# Patient Record
Sex: MALE | Race: WHITE | NOT HISPANIC OR LATINO | Employment: PART TIME | URBAN - METROPOLITAN AREA
[De-identification: names, ages, dates, MRNs, and addresses within clinical notes are randomized per-mention and may not be internally consistent; named-entity substitution may affect disease eponyms.]

---

## 2018-06-19 ENCOUNTER — HOSPITAL ENCOUNTER (EMERGENCY)
Facility: HOSPITAL | Age: 18
Discharge: LEFT BEFORE DISCHARGE | End: 2018-06-19
Payer: COMMERCIAL

## 2018-06-19 VITALS
HEART RATE: 97 BPM | OXYGEN SATURATION: 98 % | DIASTOLIC BLOOD PRESSURE: 77 MMHG | RESPIRATION RATE: 18 BRPM | SYSTOLIC BLOOD PRESSURE: 154 MMHG | TEMPERATURE: 99 F

## 2018-06-19 PROCEDURE — 93005 ELECTROCARDIOGRAM TRACING: CPT

## 2018-06-20 LAB
ATRIAL RATE: 99 BPM
P AXIS: 33 DEGREES
PR INTERVAL: 152 MS
QRS AXIS: 12 DEGREES
QRSD INTERVAL: 72 MS
QT INTERVAL: 322 MS
QTC INTERVAL: 405 MS
T WAVE AXIS: 51 DEGREES
VENTRICULAR RATE: 95 BPM

## 2018-06-20 PROCEDURE — 93010 ELECTROCARDIOGRAM REPORT: CPT | Performed by: INTERNAL MEDICINE

## 2019-08-13 ENCOUNTER — OFFICE VISIT (OUTPATIENT)
Dept: URGENT CARE | Age: 19
End: 2019-08-13
Payer: COMMERCIAL

## 2019-08-13 VITALS
TEMPERATURE: 98.5 F | DIASTOLIC BLOOD PRESSURE: 61 MMHG | HEART RATE: 95 BPM | SYSTOLIC BLOOD PRESSURE: 125 MMHG | BODY MASS INDEX: 39.17 KG/M2 | OXYGEN SATURATION: 96 % | HEIGHT: 75 IN | RESPIRATION RATE: 20 BRPM | WEIGHT: 315 LBS

## 2019-08-13 DIAGNOSIS — L02.416 ABSCESS OF LEFT THIGH: Primary | ICD-10-CM

## 2019-08-13 PROCEDURE — 99203 OFFICE O/P NEW LOW 30 MIN: CPT | Performed by: NURSE PRACTITIONER

## 2019-08-13 PROCEDURE — G0382 LEV 3 HOSP TYPE B ED VISIT: HCPCS | Performed by: NURSE PRACTITIONER

## 2019-08-13 PROCEDURE — 99283 EMERGENCY DEPT VISIT LOW MDM: CPT | Performed by: NURSE PRACTITIONER

## 2019-08-13 RX ORDER — DULOXETIN HYDROCHLORIDE 60 MG/1
60 CAPSULE, DELAYED RELEASE ORAL DAILY
COMMUNITY
End: 2022-06-15

## 2019-08-13 RX ORDER — BUSPIRONE HYDROCHLORIDE 15 MG/1
15 TABLET ORAL 3 TIMES DAILY
COMMUNITY
End: 2022-06-15

## 2019-08-13 RX ORDER — LITHIUM CARBONATE 300 MG
300 TABLET ORAL 3 TIMES DAILY
COMMUNITY
End: 2022-06-15

## 2019-08-13 RX ORDER — CEPHALEXIN 500 MG/1
500 CAPSULE ORAL EVERY 8 HOURS SCHEDULED
Qty: 21 CAPSULE | Refills: 0 | Status: SHIPPED | OUTPATIENT
Start: 2019-08-13 | End: 2019-08-20

## 2019-08-13 RX ORDER — SULFAMETHOXAZOLE AND TRIMETHOPRIM 800; 160 MG/1; MG/1
1 TABLET ORAL EVERY 12 HOURS SCHEDULED
Qty: 20 TABLET | Refills: 0 | Status: SHIPPED | OUTPATIENT
Start: 2019-08-13 | End: 2019-08-23

## 2019-08-13 NOTE — PROGRESS NOTES
NAME: Nadir Jiménez is a 19 y o  male  : 2000    MRN: [id number]      Assessment and Plan   Abscess of left thigh [L02 416]  1  Abscess of left thigh  sulfamethoxazole-trimethoprim (BACTRIM DS) 800-160 mg per tablet    cephalexin (KEFLEX) 500 mg capsule       Radha Benítez was seen today for mass  Diagnoses and all orders for this visit:    Abscess of left thigh  -     sulfamethoxazole-trimethoprim (BACTRIM DS) 800-160 mg per tablet; Take 1 tablet by mouth every 12 (twelve) hours for 10 days  -     cephalexin (KEFLEX) 500 mg capsule; Take 1 capsule (500 mg total) by mouth every 8 (eight) hours for 7 days     Abscesses at this time are unable to be cultured    Patient Instructions   Patient Instructions   Discussed with patient about Cindy Holley the abscess and having a drain and packing it however patient will not be in the area and does not want to go the ER  He does not want to have it opened and is aware of the risks of not having it drained  If symptoms worsen he will go to the ER  Aware to take antibiotics as directed      We offer over-the-counter meds that can be purchased here at the office for your convenience   Cough and cold meds:   Mucinex for $14 00   Mucinex DM for $14 00   Delsym for $14 dollars   Cepacol Extra strength for $4 00,     Allergy medicine  Bendaryl for $4 00  Cetrizine (Zyrtec) for $4 00,     Pain relief  Ibuprofen (motrin) for $4 00  Acetaminophen (tylenol) for $4 00  Childrens tylenol and motrin for $4 00    Itch and Rash Relief  % Hydrocortisone Cream for $4 00      Follow up with pcp   Discussed with patient that these abscesses continue to worsen he will need to go to the ER for possible packing  Patient is leaving in 2 days for lotion trip and aware to take both antibiotics  Patient also discussed phone up with General surgery as well the multiple abscesses had over the years and scarring    Symptoms of fever, increased pain swelling continue go to the ER      Abscess   WHAT YOU NEED TO KNOW:   A warm compress may help your abscess drain  Your healthcare provider may make a cut in the abscess so it can drain  You may need surgery to remove an abscess that is on your hands or buttocks  DISCHARGE INSTRUCTIONS:   Return to the emergency department if:   · The area around your abscess becomes very painful, warm, or has red streaks  · You have a fever and chills  · Your heart is beating faster than usual      · You feel faint or confused  Contact your healthcare provider if:   · Your abscess gets bigger or does not get better  · Your abscess returns  · You have questions or concerns about your condition or care  Medicines: You may  need any of the following:  · Antibiotics  help treat a bacterial infection  · Acetaminophen  decreases pain and fever  It is available without a doctor's order  Ask how much to take and how often to take it  Follow directions  Acetaminophen can cause liver damage if not taken correctly  · NSAIDs , such as ibuprofen, help decrease swelling, pain, and fever  This medicine is available with or without a doctor's order  NSAIDs can cause stomach bleeding or kidney problems in certain people  If you take blood thinner medicine, always ask your healthcare provider if NSAIDs are safe for you  Always read the medicine label and follow directions  · Take your medicine as directed  Contact your healthcare provider if you think your medicine is not helping or if you have side effects  Tell him or her if you are allergic to any medicine  Keep a list of the medicines, vitamins, and herbs you take  Include the amounts, and when and why you take them  Bring the list or the pill bottles to follow-up visits  Carry your medicine list with you in case of an emergency  Self-care:   · Apply a warm compress to your abscess  This will help it open and drain  Wet a washcloth in warm, but not hot, water  Apply the compress for 10 minutes   Repeat this 4 times each day  Do not  press on an abscess or try to open it with a needle  You may push the bacteria deeper or into your blood  · Do not share your clothes, towels, or sheets with anyone  This can spread the infection to others  · Wash your hands often  This can help prevent the spread of germs  Use soap and water or an alcohol-based hand rub  Care for your wound after it is drained:   · Care for your wound as directed  If your healthcare provider says it is okay, carefully remove the bandage and gauze packing  You may need to soak the gauze to get it out of your wound  Clean your wound and the area around it as directed  Dry the area and put on new, clean bandages  Change your bandages when they get wet or dirty  · Ask your healthcare provider how to change the gauze in your wound  Keep track of how many pieces of gauze are placed inside the wound  Do not put too much packing in the wound  Do not pack the gauze too tightly in your wound  Follow up with your healthcare provider in 1 to 3 days: You may need to have your packing removed or your bandage changed  Write down your questions so you remember to ask them during your visits  © 2017 [facility] [facility] Information is for End User's use only and may not be sold, redistributed or otherwise used for commercial purposes  All illustrations and images included in CareNotes® are the copyrighted property of A D A M , Inc  or Connor Alcaraz  The above information is an  only  It is not intended as medical advice for individual conditions or treatments  Talk to your doctor, nurse or pharmacist before following any medical regimen to see if it is safe and effective for you  Proceed to ER if symptoms worsen  Chief Complaint     Chief Complaint   Patient presents with    Mass     Pt reports he has two lumps on his inner and outer thigh x two weeks that are red, inflamed and draining  Has been applying Neosporin  History of Present Illness     Patient is a 68-year-old male who is here today with abscess on the left thigh inner and laterally  He has of multiple abscesses in the past and has never been followed up further  He has scar tissue to the left side of the inner and is very bothersome to touch  The abscess had sinus left inner thigh is large in size and painful to touch  He has a lot scarring and dimpling noted on the abscesses well  He has a small abscess noted to the lateral aspect of the left thigh tender to touch and has open wound to the past   He has not followed up with family doctor for this and is aware of the importance of following up with PCP  He has come in tonight due to going to vacation in 2 days to the [place] [facility]   Skin: Positive for wound (Abscesses noted to the left inner thigh and lateral aspect)  Current Medications       Current Outpatient Medications:     busPIRone (BUSPAR) 15 mg tablet, Take 15 mg by mouth 3 (three) times a day, Disp: , Rfl:     DULoxetine (CYMBALTA) 60 mg delayed release capsule, Take 60 mg by mouth daily, Disp: , Rfl:     lithium 300 MG tablet, Take 300 mg by mouth 3 (three) times a day, Disp: , Rfl:     metFORMIN (GLUCOPHAGE) 500 mg tablet, Take 500 mg by mouth 2 (two) times a day with meals, Disp: , Rfl:     cephalexin (KEFLEX) 500 mg capsule, Take 1 capsule (500 mg total) by mouth every 8 (eight) hours for 7 days, Disp: 21 capsule, Rfl: 0    sulfamethoxazole-trimethoprim (BACTRIM DS) 800-160 mg per tablet, Take 1 tablet by mouth every 12 (twelve) hours for 10 days, Disp: 20 tablet, Rfl: 0    Current Allergies     Allergies as of 08/13/2019 - Reviewed 08/13/2019   Allergen Reaction Noted    Wellbutrin [bupropion]  06/19/2018              Past Medical History:   Diagnosis Date    Anxiety     Depressed     Sleep apnea        History reviewed  No pertinent surgical history  History reviewed  No pertinent family history  Medications have been verified  The following portions of the patient's history were reviewed and updated as appropriate: allergies, current medications, past family history, past medical history, past social history, past surgical history and problem list     Objective   /61   Pulse 95   Temp 98 5 °F (36 9 °C)   Resp 20   Ht 6' 3" (1 905 m)   Wt (!) 144 kg (317 lb)   SpO2 96%   BMI 39 62 kg/m²      Physical Exam     Physical Exam   Constitutional: He appears well-developed and well-nourished  Skin: Skin is warm  Capillary refill takes less than 2 seconds  No rash noted  Psychiatric: He has a normal mood and affect         VICENTE Pacheco

## 2019-08-13 NOTE — PATIENT INSTRUCTIONS
Discussed with patient about Yanet Naga the abscess and having a drain and packing it however patient will not be in the area and does not want to go the ER  He does not want to have it opened and is aware of the risks of not having it drained  If symptoms worsen he will go to the ER  Aware to take antibiotics as directed      We offer over-the-counter meds that can be purchased here at the office for your convenience   Cough and cold meds:   Mucinex for $14 00   Mucinex DM for $14 00   Delsym for $14 dollars   Cepacol Extra strength for $4 00,     Allergy medicine  Bendaryl for $4 00  Cetrizine (Zyrtec) for $4 00,     Pain relief  Ibuprofen (motrin) for $4 00  Acetaminophen (tylenol) for $4 00  Childrens tylenol and motrin for $4 00    Itch and Rash Relief  % Hydrocortisone Cream for $4 00      Follow up with pcp   Discussed with patient that these abscesses continue to worsen he will need to go to the ER for possible packing  Patient is leaving in 2 days for lotion trip and aware to take both antibiotics  Patient also discussed phone up with General surgery as well the multiple abscesses had over the years and scarring  Symptoms of fever, increased pain swelling continue go to the ER      Abscess   WHAT YOU NEED TO KNOW:   A warm compress may help your abscess drain  Your healthcare provider may make a cut in the abscess so it can drain  You may need surgery to remove an abscess that is on your hands or buttocks  DISCHARGE INSTRUCTIONS:   Return to the emergency department if:   · The area around your abscess becomes very painful, warm, or has red streaks  · You have a fever and chills  · Your heart is beating faster than usual      · You feel faint or confused  Contact your healthcare provider if:   · Your abscess gets bigger or does not get better  · Your abscess returns  · You have questions or concerns about your condition or care  Medicines:   You may  need any of the following:  · Antibiotics  help treat a bacterial infection  · Acetaminophen  decreases pain and fever  It is available without a doctor's order  Ask how much to take and how often to take it  Follow directions  Acetaminophen can cause liver damage if not taken correctly  · NSAIDs , such as ibuprofen, help decrease swelling, pain, and fever  This medicine is available with or without a doctor's order  NSAIDs can cause stomach bleeding or kidney problems in certain people  If you take blood thinner medicine, always ask your healthcare provider if NSAIDs are safe for you  Always read the medicine label and follow directions  · Take your medicine as directed  Contact your healthcare provider if you think your medicine is not helping or if you have side effects  Tell him or her if you are allergic to any medicine  Keep a list of the medicines, vitamins, and herbs you take  Include the amounts, and when and why you take them  Bring the list or the pill bottles to follow-up visits  Carry your medicine list with you in case of an emergency  Self-care:   · Apply a warm compress to your abscess  This will help it open and drain  Wet a washcloth in warm, but not hot, water  Apply the compress for 10 minutes  Repeat this 4 times each day  Do not  press on an abscess or try to open it with a needle  You may push the bacteria deeper or into your blood  · Do not share your clothes, towels, or sheets with anyone  This can spread the infection to others  · Wash your hands often  This can help prevent the spread of germs  Use soap and water or an alcohol-based hand rub  Care for your wound after it is drained:   · Care for your wound as directed  If your healthcare provider says it is okay, carefully remove the bandage and gauze packing  You may need to soak the gauze to get it out of your wound  Clean your wound and the area around it as directed  Dry the area and put on new, clean bandages   Change your bandages when they get wet or dirty  · Ask your healthcare provider how to change the gauze in your wound  Keep track of how many pieces of gauze are placed inside the wound  Do not put too much packing in the wound  Do not pack the gauze too tightly in your wound  Follow up with your healthcare provider in 1 to 3 days: You may need to have your packing removed or your bandage changed  Write down your questions so you remember to ask them during your visits  © 2017 [address] [employer] Information is for End User's use only and may not be sold, redistributed or otherwise used for commercial purposes  All illustrations and images included in CareNotes® are the copyrighted property of A D A M , Inc  or Connor Alcaraz  The above information is an  only  It is not intended as medical advice for individual conditions or treatments  Talk to your doctor, nurse or pharmacist before following any medical regimen to see if it is safe and effective for you

## 2019-08-20 ENCOUNTER — OFFICE VISIT (OUTPATIENT)
Dept: URGENT CARE | Age: 19
End: 2019-08-20
Payer: COMMERCIAL

## 2019-08-20 VITALS
WEIGHT: 315 LBS | OXYGEN SATURATION: 96 % | SYSTOLIC BLOOD PRESSURE: 128 MMHG | BODY MASS INDEX: 39.17 KG/M2 | HEART RATE: 90 BPM | TEMPERATURE: 98.2 F | RESPIRATION RATE: 20 BRPM | DIASTOLIC BLOOD PRESSURE: 59 MMHG | HEIGHT: 75 IN

## 2019-08-20 DIAGNOSIS — L02.416 ABSCESS OF LEFT THIGH: Primary | ICD-10-CM

## 2019-08-20 PROCEDURE — 99283 EMERGENCY DEPT VISIT LOW MDM: CPT | Performed by: FAMILY MEDICINE

## 2019-08-20 PROCEDURE — G0382 LEV 3 HOSP TYPE B ED VISIT: HCPCS | Performed by: FAMILY MEDICINE

## 2019-08-20 PROCEDURE — 87070 CULTURE OTHR SPECIMN AEROBIC: CPT | Performed by: FAMILY MEDICINE

## 2019-08-20 PROCEDURE — 99213 OFFICE O/P EST LOW 20 MIN: CPT | Performed by: FAMILY MEDICINE

## 2019-08-20 PROCEDURE — 87205 SMEAR GRAM STAIN: CPT | Performed by: FAMILY MEDICINE

## 2019-08-20 NOTE — PROGRESS NOTES
[facility] [facility]        NAME: Marcheta Fleischer is a 19 y o  male  : 2000    MRN: [id number]  DATE: 2019  TIME: 5:27 PM    Assessment and Plan   Abscess of left thigh [L02 416]  1  Abscess of left thigh           Patient Instructions     Continue current antibiotics  We will wait for culture results to determine if we need to add or change any antibiotics  May not have any preliminary results before Friday evening/Saturday  Follow up with PCP in 3-5 days  Proceed to  ER if symptoms worsen  Chief Complaint     Chief Complaint   Patient presents with    Rash     Pt seen here on  for bilateral abscesses on his upper thigh  Rx'd two Antibiotics  Pt states the areas have since opened and are draining  Reports mild pain  History of Present Illness       Rash (Pt seen here on  for bilateral abscesses on his upper thigh  Rx'd two Antibiotics  Pt states the areas have since opened and are draining  Reports mild pain  )    Rash         Review of Systems   Review of Systems   Constitutional: Negative  Respiratory: Negative  Cardiovascular: Negative  Skin: Positive for wound           Current Medications       Current Outpatient Medications:     busPIRone (BUSPAR) 15 mg tablet, Take 15 mg by mouth 3 (three) times a day, Disp: , Rfl:     cephalexin (KEFLEX) 500 mg capsule, Take 1 capsule (500 mg total) by mouth every 8 (eight) hours for 7 days, Disp: 21 capsule, Rfl: 0    DULoxetine (CYMBALTA) 60 mg delayed release capsule, Take 60 mg by mouth daily, Disp: , Rfl:     lithium 300 MG tablet, Take 300 mg by mouth 3 (three) times a day, Disp: , Rfl:     metFORMIN (GLUCOPHAGE) 500 mg tablet, Take 500 mg by mouth 2 (two) times a day with meals, Disp: , Rfl:     sulfamethoxazole-trimethoprim (BACTRIM DS) 800-160 mg per tablet, Take 1 tablet by mouth every 12 (twelve) hours for 10 days, Disp: 20 tablet, Rfl: 0    Current Allergies     Allergies as of 2019 - Reviewed 08/20/2019   Allergen Reaction Noted    Wellbutrin [bupropion]  06/19/2018            The following portions of the patient's history were reviewed and updated as appropriate: allergies, current medications, past family history, past medical history, past social history, past surgical history and problem list      Past Medical History:   Diagnosis Date    Anxiety     Depressed     Sleep apnea        History reviewed  No pertinent surgical history  History reviewed  No pertinent family history  Medications have been verified  Objective   /59   Pulse 90   Temp 98 2 °F (36 8 °C)   Resp 20   Ht 6' 3" (1 905 m)   Wt (!) 144 kg (318 lb)   SpO2 96%   BMI 39 75 kg/m²        Physical Exam     Physical Exam   Constitutional: He appears well-developed and well-nourished  Cardiovascular: Normal rate and regular rhythm     Pulmonary/Chest: Effort normal and breath sounds normal    Skin:

## 2019-08-20 NOTE — PATIENT INSTRUCTIONS
Continue current antibiotics  We will wait for culture results to determine if we need to add or change any antibiotics  May not have any preliminary results before Friday evening/Saturday  Follow up with PCP in 3-5 days  Proceed to  ER if symptoms worsen  Abscess   AMBULATORY CARE:   An abscess  is an area under the skin where pus (infected fluid) collects  An abscess is often caused by bacteria, fungi or other germs that get into an open wound  You can get an abscess anywhere on your body  Common signs and symptoms of an abscess: You may have a swollen mass that is red and painful  Pus may leak out of the mass  The pus will be white or yellow and may smell bad  You may have redness and pain days before the mass appears  You may have a fever and chills if the infection spreads  Seek immediate care if:   · The area around your abscess becomes very painful, warm, or has red streaks  · You have a fever and chills  · Your heart is beating faster than usual      · You feel faint or confused  Contact your healthcare provider if:   · Your abscess gets bigger or does not get better  · Your abscess returns  · You have questions or concerns about your condition or care  Treatment for an abscess: Your healthcare provider may need to make a cut in the abscess to allow the pus to drain  You may need surgery to remove your abscess  You may  need any of the following:  · Antibiotics  help treat a bacterial infection  · Acetaminophen  decreases pain and fever  It is available without a doctor's order  Ask how much to take and how often to take it  Follow directions  Acetaminophen can cause liver damage if not taken correctly  · NSAIDs , such as ibuprofen, help decrease swelling, pain, and fever  This medicine is available with or without a doctor's order  NSAIDs can cause stomach bleeding or kidney problems in certain people   If you take blood thinner medicine, always ask your healthcare provider if NSAIDs are safe for you  Always read the medicine label and follow directions  · Take your medicine as directed  Contact your healthcare provider if you think your medicine is not helping or if you have side effects  Tell him or her if you are allergic to any medicine  Keep a list of the medicines, vitamins, and herbs you take  Include the amounts, and when and why you take them  Bring the list or the pill bottles to follow-up visits  Carry your medicine list with you in case of an emergency  Self-care:   · Apply a warm compress to your abscess  This will help it open and drain  Wet a washcloth in warm, but not hot, water  Apply the compress for 10 minutes  Repeat this 4 times each day  Do not  press on an abscess or try to open it with a needle  You may push the bacteria deeper or into your blood  · Do not share your clothes, towels, or sheets with anyone  This can spread the infection to others  · Wash your hands often  This can help prevent the spread of germs  Use soap and water or an alcohol-based hand rub  Care for your wound after it is drained:   · Care for your wound as directed  If your healthcare provider says it is okay, carefully remove the bandage and gauze packing  You may need to soak the gauze to get it out of your wound  Clean your wound and the area around it as directed  Dry the area and put on new, clean bandages  Change your bandages when they get wet or dirty  · Ask your healthcare provider how to change the gauze in your wound  Keep track of how many pieces of gauze are placed inside the wound  Do not put too much packing in the wound  Do not pack the gauze too tightly in your wound  Follow up with your healthcare provider in 1 to 3 days: You may need to have your packing removed or your bandage changed  Write down your questions so you remember to ask them during your visits     © 2017 [address] [employer] Information is for End User's use only and may not be sold, redistributed or otherwise used for commercial purposes  All illustrations and images included in CareNotes® are the copyrighted property of A D A M , Inc  or Connor Alcaraz  The above information is an  only  It is not intended as medical advice for individual conditions or treatments  Talk to your doctor, nurse or pharmacist before following any medical regimen to see if it is safe and effective for you

## 2019-08-22 LAB
BACTERIA WND AEROBE CULT: NORMAL
GRAM STN SPEC: NORMAL

## 2019-08-23 ENCOUNTER — TELEPHONE (OUTPATIENT)
Dept: URGENT CARE | Facility: CLINIC | Age: 19
End: 2019-08-23

## 2019-08-23 LAB
BACTERIA WND AEROBE CULT: NORMAL
GRAM STN SPEC: NORMAL

## 2019-08-23 NOTE — TELEPHONE ENCOUNTER
Message left on Mobile number  Wound culture shows no bacterial growth  No need for further antibiotics at this point  But if the wounds aren't healing properly, you may need to follow up with your PCP to refer to a wound care center

## 2021-04-25 ENCOUNTER — TELEPHONE (OUTPATIENT)
Dept: OTHER | Facility: OTHER | Age: 21
End: 2021-04-25

## 2022-06-01 ENCOUNTER — HOSPITAL ENCOUNTER (INPATIENT)
Facility: HOSPITAL | Age: 22
LOS: 12 days | Discharge: HOME | DRG: 885 | End: 2022-06-15
Attending: EMERGENCY MEDICINE | Admitting: PSYCHIATRY & NEUROLOGY
Payer: COMMERCIAL

## 2022-06-01 DIAGNOSIS — F32.A DEPRESSION: ICD-10-CM

## 2022-06-01 DIAGNOSIS — Z00.8 MEDICAL CLEARANCE FOR PSYCHIATRIC ADMISSION: ICD-10-CM

## 2022-06-01 DIAGNOSIS — F33.2 SEVERE EPISODE OF RECURRENT MAJOR DEPRESSIVE DISORDER, WITHOUT PSYCHOTIC FEATURES (HCC): Primary | ICD-10-CM

## 2022-06-01 LAB
AMPHETAMINES SERPL QL SCN: NEGATIVE
BARBITURATES UR QL: NEGATIVE
BENZODIAZ UR QL: NEGATIVE
COCAINE UR QL: NEGATIVE
ETHANOL EXG-MCNC: 0 MG/DL
FLUAV RNA RESP QL NAA+PROBE: NEGATIVE
FLUBV RNA RESP QL NAA+PROBE: NEGATIVE
LITHIUM SERPL-SCNC: <0.2 MMOL/L (ref 0.6–1.2)
METHADONE UR QL: NEGATIVE
OPIATES UR QL SCN: NEGATIVE
OXYCODONE+OXYMORPHONE UR QL SCN: NEGATIVE
PCP UR QL: NEGATIVE
RSV RNA RESP QL NAA+PROBE: NEGATIVE
SARS-COV-2 RNA RESP QL NAA+PROBE: NEGATIVE
THC UR QL: NEGATIVE

## 2022-06-01 PROCEDURE — 80178 ASSAY OF LITHIUM: CPT | Performed by: EMERGENCY MEDICINE

## 2022-06-01 PROCEDURE — 99284 EMERGENCY DEPT VISIT MOD MDM: CPT | Performed by: EMERGENCY MEDICINE

## 2022-06-01 PROCEDURE — 80307 DRUG TEST PRSMV CHEM ANLYZR: CPT | Performed by: EMERGENCY MEDICINE

## 2022-06-01 PROCEDURE — 82075 ASSAY OF BREATH ETHANOL: CPT | Performed by: EMERGENCY MEDICINE

## 2022-06-01 PROCEDURE — 36415 COLL VENOUS BLD VENIPUNCTURE: CPT | Performed by: EMERGENCY MEDICINE

## 2022-06-01 PROCEDURE — 0241U HB NFCT DS VIR RESP RNA 4 TRGT: CPT | Performed by: EMERGENCY MEDICINE

## 2022-06-01 PROCEDURE — 99285 EMERGENCY DEPT VISIT HI MDM: CPT

## 2022-06-01 RX ORDER — LITHIUM CARBONATE 150 MG/1
450 CAPSULE ORAL
Status: DISCONTINUED | OUTPATIENT
Start: 2022-06-02 | End: 2022-06-02

## 2022-06-01 NOTE — ED NOTES
Given safety supper tray       Lillie Marshall RN  06/01/22 1825 Rx listed below.  Preferred pharmacy has been set up and verified.
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
## 2022-06-02 RX ORDER — LITHIUM CARBONATE 150 MG/1
CAPSULE ORAL
Status: COMPLETED
Start: 2022-06-02 | End: 2022-06-02

## 2022-06-02 RX ADMIN — LITHIUM CARBONATE 450 MG: 300 CAPSULE, GELATIN COATED ORAL at 12:44

## 2022-06-02 RX ADMIN — LITHIUM CARBONATE 450 MG: 300 CAPSULE, GELATIN COATED ORAL at 16:45

## 2022-06-02 RX ADMIN — LITHIUM CARBONATE 450 MG: 300 CAPSULE, GELATIN COATED ORAL at 08:30

## 2022-06-02 RX ADMIN — LITHIUM CARBONATE 450 MG: 150 CAPSULE, GELATIN COATED ORAL at 00:15

## 2022-06-02 RX ADMIN — LITHIUM CARBONATE 450 MG: 300 CAPSULE, GELATIN COATED ORAL at 00:15

## 2022-06-02 NOTE — ED NOTES
Patient is sleeping   No signs of distress noted at this time  Patient remains Q7 safety checks       Matthieu Bone RN  06/02/22 7324

## 2022-06-02 NOTE — ED NOTES
EVS (Eligibility Verification System) called - [phone]    Automated system indicates: not eligible

## 2022-06-02 NOTE — ED NOTES
Pt mostly asleep all day, arousable to verbal stimuli, not very interactive with staff         Martha Alejo RN  06/02/22 8592

## 2022-06-02 NOTE — ED NOTES
Pt in room sleeping at this time  Respirations are normal  No distress noted  All safety precautions still in place  Sitter is at bedside          Destiny Fernandez RN  06/02/22 9158

## 2022-06-02 NOTE — ED NOTES
Patient sleeping  Mother called to ask if she can see him  Will speak with crisis  Q 7 safety checks remain in place       Sole Hairston RN  06/02/22 4883

## 2022-06-02 NOTE — ED NOTES
Pt mother can be contacted at [phone] if additional information is needed or to update on Pt disposition

## 2022-06-02 NOTE — ED NOTES
Patient fed  With patient safety tray  Patient calm and cooperative       Day Pleitez RN  06/02/22 9649

## 2022-06-02 NOTE — ED NOTES
Patient resting comfortably in room  Patient is sleeping  Q 7 safety checks remain in place       Day Pleitez RN  06/02/22 1807

## 2022-06-02 NOTE — ED NOTES
Call from patient's mother, Opal Rubin, [phone], to check status of the patient  She stated he did respond to his previous [address] [facility] treatment and this was followed by 2 months of remission  Patient then started to experience increased depression and a second course of [address] [facility] was started  After about 20 treatments, the patient dropped out and became discouraged he would ever feel better  Cymbalta was tapered down and discontinued in preparation for medication changes  However, he was to continue lithium and has not been compliant  She stated the patient is currently on medical leave from work  She stated he has been drinking 15 beers a night and he is up all night and sleeping during the day

## 2022-06-02 NOTE — ED NOTES
Patient resting comfortably  Patient sleeping Q7 safety checks remain in place       Sharon Lopez RN  06/02/22 2854

## 2022-06-02 NOTE — ED NOTES
ED Crisis made the patient aware that a bed is not yet available  He laughed and stated, "I'm just laughing cause this is Kelsey " Clarified that he had to wait for a bed several years ago as well
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
## 2022-06-02 NOTE — ED NOTES
Patient resting comfortably  No signs of distress noted  Q 7 safety checks remain in place       Vladislav Nunn RN  06/02/22 0045

## 2022-06-02 NOTE — ED NOTES
Patient is sleeping at this time, RN assessment will be delayed until patient is awake  [facility], as unable to assess while patient sleeping  Behavioral Health Assessment deferred as patient is sleeping and would benefit from additional rest   Vital signs deferred until patient awake, no signs or symptoms of respiratory distress at this time  Once patient is awake and able to participate, will complete assessments        Edmond Del Rio RN  06/01/22 2003

## 2022-06-02 NOTE — ED NOTES
[place] Suicide Risk Assessment deferred, as unable to assess while patient sleeping  Behavioral Health Assessment deferred as patient is sleeping and would benefit from additional rest   Vital signs deferred until patient awake, no signs or symptoms of respiratory distress at this time  Once patient is awake and able to participate, will complete assessments        Ofelia Mcdonald RN  06/02/22 6855
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
## 2022-06-03 PROBLEM — F33.9 MAJOR DEPRESSION, RECURRENT (HCC): Status: ACTIVE | Noted: 2022-06-03

## 2022-06-03 LAB
ATRIAL RATE: 79 BPM
P AXIS: 22 DEGREES
PR INTERVAL: 152 MS
QRS AXIS: 36 DEGREES
QRSD INTERVAL: 82 MS
QT INTERVAL: 382 MS
QTC INTERVAL: 438 MS
T WAVE AXIS: 19 DEGREES
VENTRICULAR RATE: 79 BPM

## 2022-06-03 PROCEDURE — 93010 ELECTROCARDIOGRAM REPORT: CPT | Performed by: INTERNAL MEDICINE

## 2022-06-03 PROCEDURE — 93005 ELECTROCARDIOGRAM TRACING: CPT

## 2022-06-03 RX ORDER — ACETAMINOPHEN 325 MG/1
650 TABLET ORAL EVERY 6 HOURS PRN
Status: DISCONTINUED | OUTPATIENT
Start: 2022-06-03 | End: 2022-06-15 | Stop reason: HOSPADM

## 2022-06-03 RX ORDER — HYDROXYZINE HYDROCHLORIDE 25 MG/1
25 TABLET, FILM COATED ORAL
Status: DISCONTINUED | OUTPATIENT
Start: 2022-06-03 | End: 2022-06-03

## 2022-06-03 RX ORDER — HYDROXYZINE 50 MG/1
50 TABLET, FILM COATED ORAL
Status: DISCONTINUED | OUTPATIENT
Start: 2022-06-03 | End: 2022-06-15 | Stop reason: HOSPADM

## 2022-06-03 RX ORDER — HALOPERIDOL 5 MG
5 TABLET ORAL
Status: DISCONTINUED | OUTPATIENT
Start: 2022-06-03 | End: 2022-06-15 | Stop reason: HOSPADM

## 2022-06-03 RX ORDER — ARIPIPRAZOLE 2 MG/1
2 TABLET ORAL DAILY
Status: DISCONTINUED | OUTPATIENT
Start: 2022-06-03 | End: 2022-06-15 | Stop reason: HOSPADM

## 2022-06-03 RX ORDER — OLANZAPINE 10 MG/1
10 INJECTION, POWDER, LYOPHILIZED, FOR SOLUTION INTRAMUSCULAR
Status: DISCONTINUED | OUTPATIENT
Start: 2022-06-03 | End: 2022-06-03

## 2022-06-03 RX ORDER — LANOLIN ALCOHOL/MO/W.PET/CERES
3 CREAM (GRAM) TOPICAL
Status: DISCONTINUED | OUTPATIENT
Start: 2022-06-03 | End: 2022-06-15 | Stop reason: HOSPADM

## 2022-06-03 RX ORDER — LAMOTRIGINE 25 MG/1
25 TABLET ORAL DAILY
Status: DISCONTINUED | OUTPATIENT
Start: 2022-06-03 | End: 2022-06-06

## 2022-06-03 RX ORDER — BENZTROPINE MESYLATE 1 MG/ML
1 INJECTION INTRAMUSCULAR; INTRAVENOUS
Status: DISCONTINUED | OUTPATIENT
Start: 2022-06-03 | End: 2022-06-15 | Stop reason: HOSPADM

## 2022-06-03 RX ORDER — OLANZAPINE 10 MG/1
10 TABLET ORAL
Status: DISCONTINUED | OUTPATIENT
Start: 2022-06-03 | End: 2022-06-03

## 2022-06-03 RX ORDER — ACETAMINOPHEN 325 MG/1
975 TABLET ORAL EVERY 6 HOURS PRN
Status: DISCONTINUED | OUTPATIENT
Start: 2022-06-03 | End: 2022-06-15 | Stop reason: HOSPADM

## 2022-06-03 RX ORDER — PROPRANOLOL HYDROCHLORIDE 10 MG/1
10 TABLET ORAL EVERY 8 HOURS PRN
Status: DISCONTINUED | OUTPATIENT
Start: 2022-06-03 | End: 2022-06-15 | Stop reason: HOSPADM

## 2022-06-03 RX ORDER — LORAZEPAM 2 MG/ML
2 INJECTION INTRAMUSCULAR
Status: DISCONTINUED | OUTPATIENT
Start: 2022-06-03 | End: 2022-06-15 | Stop reason: HOSPADM

## 2022-06-03 RX ORDER — LORAZEPAM 1 MG/1
1 TABLET ORAL
Status: DISCONTINUED | OUTPATIENT
Start: 2022-06-03 | End: 2022-06-15 | Stop reason: HOSPADM

## 2022-06-03 RX ORDER — HYDROXYZINE 50 MG/1
50 TABLET, FILM COATED ORAL
Status: DISCONTINUED | OUTPATIENT
Start: 2022-06-03 | End: 2022-06-03

## 2022-06-03 RX ORDER — ACETAMINOPHEN 325 MG/1
650 TABLET ORAL ONCE
Status: COMPLETED | OUTPATIENT
Start: 2022-06-03 | End: 2022-06-03

## 2022-06-03 RX ORDER — HALOPERIDOL 5 MG/ML
5 INJECTION INTRAMUSCULAR
Status: DISCONTINUED | OUTPATIENT
Start: 2022-06-03 | End: 2022-06-15 | Stop reason: HOSPADM

## 2022-06-03 RX ORDER — ACETAMINOPHEN 325 MG/1
650 TABLET ORAL EVERY 4 HOURS PRN
Status: DISCONTINUED | OUTPATIENT
Start: 2022-06-03 | End: 2022-06-15 | Stop reason: HOSPADM

## 2022-06-03 RX ORDER — BENZTROPINE MESYLATE 1 MG/ML
0.5 INJECTION INTRAMUSCULAR; INTRAVENOUS
Status: DISCONTINUED | OUTPATIENT
Start: 2022-06-03 | End: 2022-06-15 | Stop reason: HOSPADM

## 2022-06-03 RX ORDER — HYDROXYZINE 50 MG/1
100 TABLET, FILM COATED ORAL
Status: DISCONTINUED | OUTPATIENT
Start: 2022-06-03 | End: 2022-06-03

## 2022-06-03 RX ORDER — OLANZAPINE 10 MG/1
5 INJECTION, POWDER, LYOPHILIZED, FOR SOLUTION INTRAMUSCULAR
Status: DISCONTINUED | OUTPATIENT
Start: 2022-06-03 | End: 2022-06-03

## 2022-06-03 RX ORDER — DIPHENHYDRAMINE HYDROCHLORIDE 50 MG/ML
50 INJECTION INTRAMUSCULAR; INTRAVENOUS EVERY 6 HOURS PRN
Status: DISCONTINUED | OUTPATIENT
Start: 2022-06-03 | End: 2022-06-03

## 2022-06-03 RX ORDER — BENZTROPINE MESYLATE 1 MG/1
1 TABLET ORAL
Status: DISCONTINUED | OUTPATIENT
Start: 2022-06-03 | End: 2022-06-15 | Stop reason: HOSPADM

## 2022-06-03 RX ORDER — LORAZEPAM 2 MG/ML
1 INJECTION INTRAMUSCULAR EVERY 4 HOURS PRN
Status: DISCONTINUED | OUTPATIENT
Start: 2022-06-03 | End: 2022-06-15 | Stop reason: HOSPADM

## 2022-06-03 RX ORDER — HALOPERIDOL 5 MG/ML
2.5 INJECTION INTRAMUSCULAR
Status: DISCONTINUED | OUTPATIENT
Start: 2022-06-03 | End: 2022-06-15 | Stop reason: HOSPADM

## 2022-06-03 RX ORDER — OLANZAPINE 5 MG/1
5 TABLET ORAL
Status: DISCONTINUED | OUTPATIENT
Start: 2022-06-03 | End: 2022-06-03

## 2022-06-03 RX ORDER — HYDROXYZINE HYDROCHLORIDE 25 MG/1
25 TABLET, FILM COATED ORAL
Status: DISCONTINUED | OUTPATIENT
Start: 2022-06-03 | End: 2022-06-15 | Stop reason: HOSPADM

## 2022-06-03 RX ORDER — OLANZAPINE 2.5 MG/1
2.5 TABLET ORAL
Status: DISCONTINUED | OUTPATIENT
Start: 2022-06-03 | End: 2022-06-03

## 2022-06-03 RX ORDER — TRAZODONE HYDROCHLORIDE 50 MG/1
50 TABLET ORAL
Status: DISCONTINUED | OUTPATIENT
Start: 2022-06-03 | End: 2022-06-06

## 2022-06-03 RX ORDER — LORAZEPAM 2 MG/ML
2 INJECTION INTRAMUSCULAR EVERY 6 HOURS PRN
Status: DISCONTINUED | OUTPATIENT
Start: 2022-06-03 | End: 2022-06-03

## 2022-06-03 RX ORDER — HALOPERIDOL 1 MG/1
2 TABLET ORAL
Status: DISCONTINUED | OUTPATIENT
Start: 2022-06-03 | End: 2022-06-15 | Stop reason: HOSPADM

## 2022-06-03 RX ORDER — LORAZEPAM 2 MG/ML
1 INJECTION INTRAMUSCULAR
Status: DISCONTINUED | OUTPATIENT
Start: 2022-06-03 | End: 2022-06-15 | Stop reason: HOSPADM

## 2022-06-03 RX ADMIN — LITHIUM CARBONATE 450 MG: 300 CAPSULE, GELATIN COATED ORAL at 16:44

## 2022-06-03 RX ADMIN — LAMOTRIGINE 25 MG: 25 TABLET ORAL at 13:12

## 2022-06-03 RX ADMIN — LITHIUM CARBONATE 450 MG: 300 CAPSULE, GELATIN COATED ORAL at 11:45

## 2022-06-03 RX ADMIN — ARIPIPRAZOLE 2 MG: 2 TABLET ORAL at 13:12

## 2022-06-03 RX ADMIN — MELATONIN TAB 3 MG 3 MG: 3 TAB at 21:35

## 2022-06-03 RX ADMIN — ACETAMINOPHEN 650 MG: 325 TABLET ORAL at 08:30

## 2022-06-03 RX ADMIN — LITHIUM CARBONATE 450 MG: 300 CAPSULE, GELATIN COATED ORAL at 08:20

## 2022-06-03 NOTE — ED NOTES
[place] Suicide Risk Assessment deferred, as unable to assess while patient sleeping  Behavioral Health Assessment deferred as patient is sleeping and would benefit from additional rest   Vital signs deferred until patient awake, no signs or symptoms of respiratory distress at this time  Once patient is awake and able to participate, will complete assessments        Mendez Priest RN  06/03/22 3901

## 2022-06-03 NOTE — ED NOTES
Patient is accepted at [facility]  Patient is accepted by Jimi Purvis MD       Patient may go to the floor at **  Nurse report is to be called to  prior to patient transfer

## 2022-06-03 NOTE — ED NOTES
Patient given fluids, taking fluids well   Ice water offered as requested     Kathy Barnard RN  06/03/22 5695

## 2022-06-03 NOTE — ED NOTES
Insurance Authorization Request for admission:   Phone call placed to [address] [place] [address]  Phone number: [phone]  Spoke to [employer]      Clinical submitted by fax  Fax number: [phone]  Level of care: IP  Review on TBD  Pending Authorization # [id number]      To be confirmed upon approval
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
## 2022-06-03 NOTE — ED NOTES
Patient sleeping  No signs of distress noted  Q 7 safety checks remain in place       Zabrina Leblanc RN  06/02/22 9456

## 2022-06-03 NOTE — ED NOTES
Patient sleeping comfortably  No signs of distress noted at this time  Q 7 safety checks remain in place       Juan Gould RN  06/02/22 2777

## 2022-06-03 NOTE — ED NOTES
Pt given sandwich,chips ,pretzels,cake,water and ginger ale @ bedside  Tolerating well  Pt has no other needs @ this time  All safety precautions are in place       Clarke Persaud  06/03/22 0146

## 2022-06-03 NOTE — NURSING NOTE
When provided scheduled medications Abilify and Lamictal, pt was visibly upset in a non-threatening manner  Pt said, "They tried these meds before and these meds did nothing for me "  Pt was hesitantly compliant with scheduled medications  EKG was completed; normal sinus rhythm noted  When asked about how he is feeling and if feeling depressed, pt was hesitant to answer but eventually said, "No "  Will continue to monitor

## 2022-06-03 NOTE — ED CARE HANDOFF
Emergency Department Sign Out Note        Sign out and transfer of care from Dr Ann Marie Moscoso  See Separate Emergency Department note  The patient, Nisreen Yuen, was evaluated by the previous provider for depression  Workup Completed:  Medically clear for opsych eval    ED Course / Workup Pending (followup): Receiving home meds, needs evaluation by Psychiatry for transcranial magnetic stimulation                                     Procedures  MDM        Disposition  Final diagnoses:   Severe episode of recurrent major depressive disorder, without psychotic features ([facility] [address] )     Time reflects when diagnosis was documented in both MDM as applicable and the Disposition within this note     Time User Action Codes Description Comment    6/1/2022  4:17 PM Vicenta Nissen Add [F33 2] Severe episode of recurrent major depressive disorder, without psychotic features [facility])       ED Disposition     ED Disposition   Transfer to [address] [place] [address]   --    Date/Time   Wed Jun 1, 2022  4:18 PM    Comment   Nisreen Yuen should be transferred out to D and has been medically cleared  MD Documentation    [address] Most Recent Value   Sending MD Dr Diaz Favors    None       Patient's Medications   Discharge Prescriptions    No medications on file     No discharge procedures on file         ED Provider  Electronically Signed by     Lucía Nguyen MD  06/03/22 0762

## 2022-06-03 NOTE — PLAN OF CARE
Problem: Ineffective Coping  Goal: Identifies ineffective coping skills  Outcome: Not Progressing     Problem: Depression  Goal: Treatment Goal: Demonstrate behavioral control of depressive symptoms, verbalize feelings of improved mood/affect, and adopt new coping skills prior to discharge  Outcome: Not Progressing  Goal: Verbalize thoughts and feelings  Description: Interventions:  - Assess and re-assess patient's level of risk   - Engage patient in 1:1 interactions, daily, for a minimum of 15 minutes   - Encourage patient to express feelings, fears, frustrations, hopes   Outcome: Progressing  Goal: Refrain from harming self  Description: Interventions:  - Monitor patient closely, per order   - Supervise medication ingestion, monitor effects and side effects   Outcome: Not Progressing  Goal: Refrain from isolation  Description: Interventions:  - Develop a trusting relationship   - Encourage socialization   Outcome: Not Progressing  Goal: Refrain from self-neglect  Outcome: Not Progressing  Goal: Attend and participate in unit activities, including therapeutic, recreational, and educational groups  Description: Interventions:  - Provide therapeutic and educational activities daily, encourage attendance and participation, and document same in the medical record   Outcome: Not Progressing  Goal: Complete daily ADLs, including personal hygiene independently, as able  Description: Interventions:  - Observe, teach, and assist patient with ADLS  -  Monitor and promote a balance of rest/activity, with adequate nutrition and elimination   Outcome: Not Progressing     Problem: Anxiety  Goal: Anxiety is at manageable level  Description: Interventions:  - Assess and monitor patient's anxiety level  - Monitor for signs and symptoms (heart palpitations, chest pain, shortness of breath, headaches, nausea, feeling jumpy, restlessness, irritable, apprehensive)     - Collaborate with interdisciplinary team and initiate plan and interventions as ordered    - [place] patient to unit/surroundings  - Explain treatment plan  - Encourage participation in care  - Encourage verbalization of concerns/fears  - Identify coping mechanisms  - Assist in developing anxiety-reducing skills  - Administer/offer alternative therapies  - Limit or eliminate stimulants  Outcome: Not Progressing     Problem: Risk for Violence/Aggression Toward Others  Goal: Treatment Goal: Refrain from acts of violence/aggression during length of stay, and demonstrate improved impulse control at the time of discharge  Outcome: Not Progressing  Goal: Verbalize thoughts and feelings  Description: Interventions:  - Assess and re-assess patient's level of risk, every waking shift  - Engage patient in 1:1 interactions, daily, for a minimum of 15 minutes   - Allow patient to express feelings and frustrations in a safe and non-threatening manner   - Establish rapport/trust with patient   Outcome: Not Progressing  Goal: Refrain from harming others  Outcome: Not Progressing  Goal: Refrain from destructive acts on the environment or property  Outcome: Not Progressing  Goal: Control angry outbursts  Description: Interventions:  - Monitor patient closely, per order  - Ensure early verbal de-escalation  - Monitor prn medication needs  - Set reasonable/therapeutic limits, outline behavioral expectations, and consequences   - Provide a non-threatening milieu, utilizing the least restrictive interventions   Outcome: Not Progressing  Goal: Attend and participate in unit activities, including therapeutic, recreational, and educational groups  Description: Interventions:  - Provide therapeutic and educational activities daily, encourage attendance and participation, and document same in the medical record   Outcome: Not Progressing  Goal: Identify appropriate positive anger management techniques  Description: Interventions:  - Offer anger management and coping skills groups   - Staff will provide positive feedback for appropriate anger control  Outcome: Not Progressing

## 2022-06-04 PROBLEM — G47.33 OSA (OBSTRUCTIVE SLEEP APNEA): Status: ACTIVE | Noted: 2022-06-04

## 2022-06-04 PROBLEM — F10.10 ALCOHOL ABUSE: Status: ACTIVE | Noted: 2022-06-04

## 2022-06-04 PROBLEM — E66.01 MORBID OBESITY WITH BMI OF 50.0-59.9, ADULT (HCC): Status: ACTIVE | Noted: 2022-06-04

## 2022-06-04 PROBLEM — Z00.8 MEDICAL CLEARANCE FOR PSYCHIATRIC ADMISSION: Status: ACTIVE | Noted: 2022-06-04

## 2022-06-04 PROCEDURE — 99253 IP/OBS CNSLTJ NEW/EST LOW 45: CPT | Performed by: PHYSICIAN ASSISTANT

## 2022-06-04 PROCEDURE — 99222 1ST HOSP IP/OBS MODERATE 55: CPT | Performed by: STUDENT IN AN ORGANIZED HEALTH CARE EDUCATION/TRAINING PROGRAM

## 2022-06-04 RX ADMIN — ARIPIPRAZOLE 2 MG: 2 TABLET ORAL at 08:23

## 2022-06-04 RX ADMIN — LAMOTRIGINE 25 MG: 25 TABLET ORAL at 08:23

## 2022-06-04 RX ADMIN — HALOPERIDOL LACTATE 5 MG: 5 INJECTION, SOLUTION INTRAMUSCULAR at 10:13

## 2022-06-04 RX ADMIN — LITHIUM CARBONATE 450 MG: 300 CAPSULE, GELATIN COATED ORAL at 21:49

## 2022-06-04 RX ADMIN — MELATONIN TAB 3 MG 3 MG: 3 TAB at 21:47

## 2022-06-04 RX ADMIN — LORAZEPAM 2 MG: 2 INJECTION INTRAMUSCULAR; INTRAVENOUS at 10:13

## 2022-06-04 RX ADMIN — LITHIUM CARBONATE 450 MG: 300 CAPSULE, GELATIN COATED ORAL at 09:35

## 2022-06-04 RX ADMIN — BENZTROPINE MESYLATE 1 MG: 1 INJECTION INTRAMUSCULAR; INTRAVENOUS at 10:13

## 2022-06-04 NOTE — NURSING NOTE
Patient was no pleased the writer offered him a scheduled  melatonin  He reported he sleeps just fine but took it anyway

## 2022-06-04 NOTE — CMS CERTIFICATION NOTE
Certification: Based upon physical, mental and social evaluations, I certify that inpatient psychiatric services are medically necessary for this patient for a duration of 14 midnights for the treatment of Major depression, recurrent ([facility] [address] )    Available alternative community resources do not meet the patient's mental health care needs  I further attest that an established written individualized plan of care has been implemented and is outlined in the patient's medical records

## 2022-06-04 NOTE — PLAN OF CARE
Problem: Ineffective Coping  Goal: Identifies ineffective coping skills  Outcome: Not Progressing     Problem: Depression  Goal: Treatment Goal: Demonstrate behavioral control of depressive symptoms, verbalize feelings of improved mood/affect, and adopt new coping skills prior to discharge  Outcome: Not Progressing  Goal: Verbalize thoughts and feelings  Description: Interventions:  - Assess and re-assess patient's level of risk   - Engage patient in 1:1 interactions, daily, for a minimum of 15 minutes   - Encourage patient to express feelings, fears, frustrations, hopes   Outcome: Progressing  Goal: Refrain from harming self  Description: Interventions:  - Monitor patient closely, per order   - Supervise medication ingestion, monitor effects and side effects   Outcome: Progressing  Goal: Refrain from isolation  Description: Interventions:  - Develop a trusting relationship   - Encourage socialization   Outcome: Progressing  Goal: Refrain from self-neglect  Outcome: Progressing  Goal: Attend and participate in unit activities, including therapeutic, recreational, and educational groups  Description: Interventions:  - Provide therapeutic and educational activities daily, encourage attendance and participation, and document same in the medical record   Outcome: Progressing  Goal: Complete daily ADLs, including personal hygiene independently, as able  Description: Interventions:  - Observe, teach, and assist patient with ADLS  -  Monitor and promote a balance of rest/activity, with adequate nutrition and elimination   Outcome: Progressing     Problem: Anxiety  Goal: Anxiety is at manageable level  Description: Interventions:  - Assess and monitor patient's anxiety level  - Monitor for signs and symptoms (heart palpitations, chest pain, shortness of breath, headaches, nausea, feeling jumpy, restlessness, irritable, apprehensive)     - Collaborate with interdisciplinary team and initiate plan and interventions as ordered    - [place] patient to unit/surroundings  - Explain treatment plan  - Encourage participation in care  - Encourage verbalization of concerns/fears  - Identify coping mechanisms  - Assist in developing anxiety-reducing skills  - Administer/offer alternative therapies  - Limit or eliminate stimulants  Outcome: Not Progressing     Problem: Risk for Violence/Aggression Toward Others  Goal: Treatment Goal: Refrain from acts of violence/aggression during length of stay, and demonstrate improved impulse control at the time of discharge  Outcome: Progressing  Goal: Verbalize thoughts and feelings  Description: Interventions:  - Assess and re-assess patient's level of risk, every waking shift  - Engage patient in 1:1 interactions, daily, for a minimum of 15 minutes   - Allow patient to express feelings and frustrations in a safe and non-threatening manner   - Establish rapport/trust with patient   Outcome: Progressing  Goal: Refrain from harming others  Outcome: Progressing  Goal: Refrain from destructive acts on the environment or property  Outcome: Progressing  Goal: Control angry outbursts  Description: Interventions:  - Monitor patient closely, per order  - Ensure early verbal de-escalation  - Monitor prn medication needs  - Set reasonable/therapeutic limits, outline behavioral expectations, and consequences   - Provide a non-threatening milieu, utilizing the least restrictive interventions   Outcome: Progressing  Goal: Attend and participate in unit activities, including therapeutic, recreational, and educational groups  Description: Interventions:  - Provide therapeutic and educational activities daily, encourage attendance and participation, and document same in the medical record   Outcome: Progressing  Goal: Identify appropriate positive anger management techniques  Description: Interventions:  - Offer anger management and coping skills groups   - Staff will provide positive feedback for appropriate anger control  Outcome: Progressing

## 2022-06-04 NOTE — CONSULTS
[address] 2000, 19 y o  male MRN: [id number]  Unit/Bed#: [facility] 374-01 Encounter: [id number]  Primary Care Provider: Carol Louie MD   Date and time admitted to hospital: 6/1/2022  4:10 PM    Inpatient consult for Medical Clearance for [facility] patient  Consult performed by: Anita Maciel PA-C  Consult ordered by: Tab Lanza PA-C          Medical clearance for psychiatric admission  Assessment & Plan  Patient is medically cleared for admission to the [facility] for treatment of the underlying psychiatric illness    Morbid obesity with BMI of 50 0-59 9, adult ([facility] [address] )  Assessment & Plan  · BMI 52 2  · Encouraged lifestyle modifications    KEYLA (obstructive sleep apnea)  Assessment & Plan  · Patient reports that he tried CPAP but it "didn't work" because the machine leaked  · Recommend follow up with sleep medicine for evaluation    * Major depression, recurrent ([facility] [address] )  Assessment & Plan  · Management per psychiatry    ECT Clearance:   History of recent seizure or stroke:  no   History of pheochromocytoma:  no   History of active bleeding (Intracranial hemorrhage, aneurysm or AVM):  no   History of metallic implants in the head or neck:  no   History of increased intracranial pressure with mass effect:  no  ·   EKG within 3 months? 06/03/22 yes  o If yes, was an arrhythmia present and at baseline? No arrhythmia - NSR  o If yes, what is the baseline QT/QTc interval? 382/405  o If no, obtain prior to ECT for arrhythmia evaluation and baseline QT interval     Based on above criteria, Patient is medically cleared for ECT should it be recommended  Counseling / Coordination of Care Time: 30 minutes  Greater than 50% of total time spent on patient counseling and coordination of care  Collaboration of Care:  Were Recommendations Directly Discussed with Primary Treatment Team? - No     History of Present Illness:    Dinora Aragon is a 19 y o  male who is originally admitted to the psychiatry service due to worsening depression and agitation  We are consulted for medical clearance for admission to [facility] and treatment of underlying psychiatric illness  This patient has a past medical history significant for obesity and KEYLA  Very agitated on exam  Patient is upset that he is not seeing his attending psychiatrist and is not getting the appropriate meds  On evaluation patient denies any physical complaints such as headache, dizziness, chest pain, shortness of breath, nausea/vomiting/diarrhea at this time  Review of Systems:    Review of Systems   Constitutional: Negative for activity change, chills, diaphoresis and fever  HENT: Negative for congestion, rhinorrhea, sinus pressure, sinus pain and sore throat  Eyes: Negative for visual disturbance  Respiratory: Negative for cough, shortness of breath and wheezing  Cardiovascular: Negative for chest pain and palpitations  Gastrointestinal: Negative for abdominal distention, abdominal pain, constipation, diarrhea, nausea and vomiting  Genitourinary: Negative for dysuria, frequency, hematuria and urgency  Musculoskeletal: Negative for arthralgias, back pain and myalgias  Skin: Negative for rash  Neurological: Negative for dizziness, weakness, light-headedness and headaches  Psychiatric/Behavioral: Positive for agitation  Past Medical and Surgical History:     Past Medical History:   Diagnosis Date    Alcohol abuse     Anxiety     Bipolar disorder ([facility] [address] )     Depressed     Depression     Sleep apnea     Sleep difficulties        History reviewed  No pertinent surgical history  Meds/Allergies:    all medications and allergies reviewed    Allergies:    Allergies   Allergen Reactions    Wellbutrin [Bupropion]        Social History:     Marital Status: Single    Substance Use History:   Social History     Substance and Sexual Activity   Alcohol Use Yes    Alcohol/week: 105 0 standard drinks    Types: 105 Cans of beer per week    Comment: Mother stated he drinks 15 cans of beer per night     Social History     Tobacco Use   Smoking Status Never Smoker   Smokeless Tobacco Not on file     Social History     Substance and Sexual Activity   Drug Use No       Family History:    non-contributory    Physical Exam:     Vitals:   Blood Pressure: 115/66 (06/04/22 0724)  Pulse: 86 (06/04/22 0724)  Temperature: (!) 96 9 °F (36 1 °C) (06/04/22 0724)  Temp Source: Temporal (06/04/22 0724)  Respirations: 16 (06/04/22 0724)  Height: 6' 3" (190 5 cm) (06/03/22 1209)  Weight - Scale: (!) 189 kg (417 lb 9 6 oz) (06/03/22 1209)  SpO2: 97 % (06/04/22 0724)    Physical Exam  Constitutional:       General: He is not in acute distress  Appearance: Normal appearance  He is diaphoretic  He is not ill-appearing or toxic-appearing  HENT:      Head: Normocephalic and atraumatic  Nose: Nose normal  No congestion or rhinorrhea  Mouth/Throat:      Mouth: Mucous membranes are moist    Eyes:      General: No scleral icterus  Extraocular Movements: Extraocular movements intact  Cardiovascular:      Rate and Rhythm: Regular rhythm  Tachycardia present  Heart sounds: Normal heart sounds  No murmur heard  Pulmonary:      Effort: Pulmonary effort is normal  No respiratory distress  Breath sounds: Normal breath sounds  No wheezing  Abdominal:      General: Abdomen is flat  Bowel sounds are normal  There is no distension  Palpations: Abdomen is soft  Tenderness: There is no abdominal tenderness  Musculoskeletal:      Right lower leg: No edema  Left lower leg: No edema  Skin:     General: Skin is warm  Coloration: Skin is not jaundiced  Neurological:      General: No focal deficit present  Mental Status: He is alert and oriented to person, place, and time  Psychiatric:         Mood and Affect: Affect is angry           Behavior: Behavior is agitated  Additional Data:     Lab Results: I have personally reviewed pertinent reports  Lab Results   Component Value Date/Time    HGBA1C 4 8 07/02/2019 10:59 AM    HGBA1C 5 2 10/27/2018 09:08 AM           EKG, Pathology, and Other Studies Reviewed on Admission:   EKG: Normal sinus rhythm  Normal ECG  When compared with ECG of 19-JUN-2018 21:29,  No significant change was found  · Confirmed by Naldo Dubose ([id number]) on 6/3/2022 5:19:10 PM    ** Please Note: This note has been constructed using a voice recognition system   **

## 2022-06-04 NOTE — NURSING NOTE
Patient is sleeping from receiving prn medications earlier  He did not wake up for his afternoon dose of lithium

## 2022-06-04 NOTE — NURSING NOTE
Patient refused am scheduled medications  He was irritable and stated that he is not supposed to be taking Lamictal and Abilify  Pt would like to speak to Dr Briseyda Bunn before continuing any medications  He endorses good appetite and sleep  Denies SI, HI, AVH  Appears depressed  He is visible in the milieu with peers  Will continue to monitor

## 2022-06-04 NOTE — ASSESSMENT & PLAN NOTE
Patient is medically cleared for admission to the [facility] for treatment of the underlying psychiatric illness

## 2022-06-04 NOTE — NURSING NOTE
After initially refusing medications  Pt spoke to Dr Rah Marie and then agreed to take all scheduled medications  After taking the medications pt began to accuse writer of "giving the wrong medications" he stated he is only supposed to take lithium and xanax  Pt began to threaten staff members and stated "you guys don't know what you're doing here, especially you fucker " Pt would not deescalate and refused po medications offered to assist with agitation  Security was called for a walkthrough and pt was given IM haldol 5 mg, ativan 2 mg, and 1 mg of cogentin which was tolerated  Pt agreed to stay in his room until he can remain calm  Will monitor for effectiveness

## 2022-06-04 NOTE — TREATMENT PLAN
TREATMENT PLAN REVIEW - [address] [age] y o  2000 male MRN: [id number]    [address] [address] Room / Bed: [place] [address]/[facility] 899-62 Encounter: [id number]          Admit Date/Time:  6/1/2022  4:10 PM    Treatment Team: Attending Provider: Aidan Disla MD; Patient Care Technician: Farhat Araan; Patient Care Assistant: Makenna Mehta; Registered Nurse: Rommel Rojas RN; Patient Care Assistant: Kike Daniel;  Patient Care Assistant: Susanne Horan    Diagnosis: Principal Problem:    Major depression, recurrent ([place] [address] )  Active Problems:    Medical clearance for psychiatric admission    KEYLA (obstructive sleep apnea)    Morbid obesity with BMI of 50 0-59 9, adult ([place] [address] )      Patient Strengths/Assets: supportive family/friends    Patient Barriers/Limitations: difficulty adapting, noncompliant with medication, noncompliant with treatment, alcohol abuse    Short Term Goals: decrease in depressive symptoms, decrease in suicidal thoughts, decrease in self abusive behaviors, improvement in insight, improvement in reality testing, improvement in reasoning ability, mood stabilization    Long Term Goals: improvement in depression, stabilization of mood, free of suicidal thoughts, free of homicidal thoughts, no self abusive behavior, improvement in reality testing, improvement in reasoning ability    Progress Towards Goals: starting psychiatric medications as prescribed    Recommended Treatment: medication management, patient medication education, group therapy, milieu therapy, continued Behavioral Health psychiatric evaluation/assessment process    Treatment Frequency: daily medication monitoring, group and milieu therapy daily, monitoring through interdisciplinary rounds, monitoring through weekly patient care conferences    Expected Discharge Date:  14 days    Discharge Plan: return to previous living arrangement, f/u w/ outpatient psychiatrist    Treatment Plan Created/Updated By: Truong Luna MD
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
## 2022-06-04 NOTE — ASSESSMENT & PLAN NOTE
· Patient reports that he tried CPAP but it "didn't work" because the machine leaked  · Recommend follow up with sleep medicine for evaluation

## 2022-06-05 PROCEDURE — 99232 SBSQ HOSP IP/OBS MODERATE 35: CPT | Performed by: STUDENT IN AN ORGANIZED HEALTH CARE EDUCATION/TRAINING PROGRAM

## 2022-06-05 RX ADMIN — LITHIUM CARBONATE 450 MG: 300 CAPSULE, GELATIN COATED ORAL at 16:26

## 2022-06-05 RX ADMIN — LITHIUM CARBONATE 450 MG: 300 CAPSULE, GELATIN COATED ORAL at 08:11

## 2022-06-05 RX ADMIN — LITHIUM CARBONATE 450 MG: 300 CAPSULE, GELATIN COATED ORAL at 12:36

## 2022-06-05 RX ADMIN — MELATONIN TAB 3 MG 3 MG: 3 TAB at 21:11

## 2022-06-05 NOTE — NURSING NOTE
Pt cooperative, appears guarded, depressed, flat, nervous  He states he is "a little bit nervous because I am not used to this"  Denies SI/HI/AVH at this time  Pt states he slept 16° yesterday  Refused scheduled Lamictal and Abilify, states he wants to speak with Dr Briseyda Bunn before he take anything other than his Lithium  Pt showered after breakfast, asked to do laundry and stated "there a little blood in there, I may be bleeding from my butt or testicles, its been that way for years  I just didn't want anyone thinking that I cut myself"  Pt refused to have area examined by staff  Small amount of blood noted on wash cloth and towels  Pt otherwise visible on the unit, social with select peers, compliant with meals, appetite good  Denies any unmet needs at this time  Will monitor

## 2022-06-05 NOTE — PLAN OF CARE
Problem: Risk for Violence/Aggression Toward Others  Goal: Treatment Goal: Refrain from acts of violence/aggression during length of stay, and demonstrate improved impulse control at the time of discharge  Outcome: Progressing  Goal: Verbalize thoughts and feelings  Description: Interventions:  - Assess and re-assess patient's level of risk, every waking shift  - Engage patient in 1:1 interactions, daily, for a minimum of 15 minutes   - Allow patient to express feelings and frustrations in a safe and non-threatening manner   - Establish rapport/trust with patient   Outcome: Progressing  Goal: Refrain from harming others  Outcome: Progressing  Goal: Refrain from destructive acts on the environment or property  Outcome: Progressing  Goal: Control angry outbursts  Description: Interventions:  - Monitor patient closely, per order  - Ensure early verbal de-escalation  - Monitor prn medication needs  - Set reasonable/therapeutic limits, outline behavioral expectations, and consequences   - Provide a non-threatening milieu, utilizing the least restrictive interventions   Outcome: Progressing  Goal: Attend and participate in unit activities, including therapeutic, recreational, and educational groups  Description: Interventions:  - Provide therapeutic and educational activities daily, encourage attendance and participation, and document same in the medical record   Outcome: Progressing  Goal: Identify appropriate positive anger management techniques  Description: Interventions:  - Offer anger management and coping skills groups   - Staff will provide positive feedback for appropriate anger control  Outcome: Progressing

## 2022-06-05 NOTE — PROGRESS NOTES
Progress Note - R Arturo Palomares [age] y o  male MRN: [id number]  Unit/Bed#: [facility] 374-01 Encounter: [id number]       Patient was visited on unit for continuing care; chart reviewed and discussed with the nursing staff  On approach, the patient was slightly irritable and guarded  He reported that would only take new meds after talking to Dr Jory Quiroz, and refused Abilify and Lamictal, and was partially compliant with lithium  Denied any changes in his mood, appetite, and energy level  No problem initiating and maintaining sleep  Denied A/VH currently  Denied SI/HI, intent or plan upon direct inquiry at this time  Patient continues to be guarded with selective interactions with staff and peers  No reports of aggression or self-injurious behavior on unit  Received Haldol 5 mg and Ativan 2 mg IM PRN at 1013 yesterday for agitation and irritability       Current Mental Status Evaluation:  Appearance and attitude: appeared as stated age, casually dressed, overweight, bearded, with fair hygiene  Eye contact: good  Motor Function: within normal limits, intact gait, No PMA/PMR  Gait/station: normal gait/station and normal balance  Speech: normal for rate, rhythm, volume, latency, amount  Language: No overt abnormality  Mood/affect: dysphoric / Affect was constricted, mood-congruent  Thought Processes: concrete  Thought content: denied suicidal ideations or homicidal ideations, some paranoia  Associations: concrete associations  Perceptual disturbances: denies Auditory/Visual/Tactile Hallucinations  Orientation: oriented to time, person, place and to the situational context  Cognitive Function: intact  Memory: recent and remote memory grossly intact  Intellect: h/o learning disability  Fund of knowledge: aware of current events, aware of past history and vocabulary average  Impulse control: good  Insight/judgment: fair/fair    Pain: denied  Pain scale: 0    Vital signs in last 24 hours:    Temp:  [97 8 °F (36 6 °C)] 97 8 °F (36 6 °C)  HR:  [94] 94  Resp:  [16] 16  BP: (145)/(80) 145/80    Laboratory results: I have personally reviewed all pertinent laboratory/tests results    Results from the past 24 hours: No results found for this or any previous visit (from the past 24 hour(s))  Progress Toward Goals: limited improvement    Assessment:  Principal Problem:    Major depression, recurrent (H[facility])  Active Problems:    Medical clearance for psychiatric admission    KEYLA (obstructive sleep apnea)    Morbid obesity with BMI of 50 0-59 9, adult ([facility] [address] )    Alcohol abuse        Plan:  - f/u SLIM recs regarding the medical problems  - Continue medication titration and treatment plan; adjust medication to optimize treatment response and as clinically indicated       Scheduled medications:  Current Facility-Administered Medications   Medication Dose Route Frequency Provider Last Rate    acetaminophen  650 mg Oral Q6H PRN [facility]      acetaminophen  650 mg Oral Q4H PRN [facility]      acetaminophen  975 mg Oral Q6H PRN [facility]      ARIPiprazole  2 mg Oral Daily [place], [place]      haloperidol lactate  2 5 mg Intramuscular Q6H PRN Max 4/day [place], [place]      And    LORazepam  1 mg Intramuscular Q6H PRN Max 4/day [place], [place]      And    benztropine  0 5 mg Intramuscular Q6H PRN Max 4/day [place], [place]      haloperidol lactate  5 mg Intramuscular Q4H PRN Max 4/day [facility]      And    LORazepam  2 mg Intramuscular Q4H PRN Max 4/day [facility]      And    benztropine  1 mg Intramuscular Q4H PRN Max 4/day [facility]      benztropine  1 mg Intramuscular Q4H PRN Max 6/day [facility]      benztropine  1 mg Oral Q4H PRN Max 6/day [place], [place]      haloperidol  2 mg Oral Q4H PRN Max 6/day [place], [place]      haloperidol  5 mg Oral Q6H PRN Max 4/day [facility]      haloperidol  5 mg Oral Q4H PRN Max 4/day [place], [place]      hydrOXYzine HCL  25 mg Oral Q6H PRN Max 4/day [place], [place]      hydrOXYzine HCL  50 mg Oral Q4H PRN Max 4/day [place], [place]      Or    LORazepam  1 mg Intramuscular Q4H PRN [place], [place]      lamoTRIgine  25 mg Oral Daily [place], [place]      lithium carbonate  450 mg Oral TID With Meals [place], [place]      LORazepam  1 mg Oral Q4H PRN Max 6/day [place], [place]      Or    LORazepam  2 mg Intramuscular Q6H PRN Max 3/day [facility]      melatonin  3 mg Oral HS [place], [place]      propranolol  10 mg Oral Q8H PRN [place], [place]      traZODone  50 mg Oral HS PRN Isabela Medellin PA-C          PRN:  [facility]  acetaminophen    acetaminophen    acetaminophen    haloperidol lactate **AND** LORazepam **AND** benztropine    haloperidol lactate **AND** LORazepam **AND** benztropine    benztropine    benztropine    haloperidol    haloperidol    haloperidol    hydrOXYzine HCL    hydrOXYzine HCL **OR** LORazepam    LORazepam **OR** LORazepam    propranolol    traZODone    - Observation: routine    - VS: as per unit protocol  - Diet: Regular diet  - Psychoeducation (benefits and potential risks) discussed, importance of compliance with the psychiatric treatment reiterated, and the patient verbalized understanding of the matter  - Encourage group attendance and milieu therapy    - The pt was educated and agreed to verbalize any suicidal thoughts, frustrations or concerns to the nursing staff, immediately  - Dispo: TBD       Next of Kin  · Extended Emergency Contact Information  · Primary Emergency Contact: katherine, none  · Mobile Phone: [phone]  · Relation: None      Counseling / Coordination of Care     Total floor / unit time spent today 20 minutes   Greater than 50% of total time was spent with the patient and / or family counseling and / or coordination of care  A description of the counseling / coordination of care  Patient's Rights, confidentiality and exceptions to confidentiality, use of automated medical record, [facility] staff access to medical record, and consent to treatment reviewed      Clay Douglas MD  Attending [address]

## 2022-06-05 NOTE — NURSING NOTE
Patient just now took his earlier scheduled dose of lithium and asked to eat and call his mother before doing so  Patient was less irritable and ate dinner at this time as well  Isolative to room and self  Not seen interacting with anyone  Cooperative but asked many questions regarding his scheduled medications

## 2022-06-06 PROBLEM — L85.3 DRY SKIN: Status: ACTIVE | Noted: 2022-06-06

## 2022-06-06 LAB
ALBUMIN SERPL BCP-MCNC: 4.3 G/DL (ref 3–5.2)
ALP SERPL-CCNC: 63 U/L (ref 43–122)
ALT SERPL W P-5'-P-CCNC: 128 U/L
ANION GAP SERPL CALCULATED.3IONS-SCNC: 8 MMOL/L (ref 5–14)
AST SERPL W P-5'-P-CCNC: 68 U/L (ref 17–59)
BASOPHILS # BLD AUTO: 0.04 THOUSANDS/ΜL (ref 0–0.1)
BASOPHILS NFR BLD AUTO: 1 % (ref 0–1)
BILIRUB SERPL-MCNC: 1.09 MG/DL
BUN SERPL-MCNC: 17 MG/DL (ref 5–25)
CALCIUM SERPL-MCNC: 9.4 MG/DL (ref 8.4–10.2)
CHLORIDE SERPL-SCNC: 105 MMOL/L (ref 97–108)
CHOLEST SERPL-MCNC: 186 MG/DL
CO2 SERPL-SCNC: 26 MMOL/L (ref 22–30)
CREAT SERPL-MCNC: 1.03 MG/DL (ref 0.7–1.5)
EOSINOPHIL # BLD AUTO: 0.21 THOUSAND/ΜL (ref 0–0.61)
EOSINOPHIL NFR BLD AUTO: 3 % (ref 0–6)
ERYTHROCYTE [DISTWIDTH] IN BLOOD BY AUTOMATED COUNT: 13.2 % (ref 11.6–15.1)
EST. AVERAGE GLUCOSE BLD GHB EST-MCNC: 94 MG/DL
GFR SERPL CREATININE-BSD FRML MDRD: 101 ML/MIN/1.73SQ M
GLUCOSE P FAST SERPL-MCNC: 105 MG/DL (ref 70–99)
GLUCOSE SERPL-MCNC: 105 MG/DL (ref 70–99)
HBA1C MFR BLD: 4.9 %
HCT VFR BLD AUTO: 43.4 % (ref 36.5–49.3)
HDLC SERPL-MCNC: 43 MG/DL
HGB BLD-MCNC: 13.9 G/DL (ref 12–17)
IMM GRANULOCYTES # BLD AUTO: 0.01 THOUSAND/UL (ref 0–0.2)
IMM GRANULOCYTES NFR BLD AUTO: 0 % (ref 0–2)
LDLC SERPL CALC-MCNC: 123 MG/DL
LYMPHOCYTES # BLD AUTO: 2.05 THOUSANDS/ΜL (ref 0.6–4.47)
LYMPHOCYTES NFR BLD AUTO: 31 % (ref 14–44)
MCH RBC QN AUTO: 28.6 PG (ref 26.8–34.3)
MCHC RBC AUTO-ENTMCNC: 32 G/DL (ref 31.4–37.4)
MCV RBC AUTO: 89 FL (ref 82–98)
MONOCYTES # BLD AUTO: 0.51 THOUSAND/ΜL (ref 0.17–1.22)
MONOCYTES NFR BLD AUTO: 8 % (ref 4–12)
NEUTROPHILS # BLD AUTO: 3.9 THOUSANDS/ΜL (ref 1.85–7.62)
NEUTS SEG NFR BLD AUTO: 57 % (ref 43–75)
NONHDLC SERPL-MCNC: 143 MG/DL
NRBC BLD AUTO-RTO: 0 /100 WBCS
PLATELET # BLD AUTO: 227 THOUSANDS/UL (ref 149–390)
PMV BLD AUTO: 10.1 FL (ref 8.9–12.7)
POTASSIUM SERPL-SCNC: 4.1 MMOL/L (ref 3.6–5)
PROT SERPL-MCNC: 7 G/DL (ref 5.9–8.4)
RBC # BLD AUTO: 4.86 MILLION/UL (ref 3.88–5.62)
SODIUM SERPL-SCNC: 139 MMOL/L (ref 137–147)
T4 FREE SERPL-MCNC: 1.1 NG/DL (ref 0.76–1.46)
TRIGL SERPL-MCNC: 101 MG/DL
TSH SERPL DL<=0.05 MIU/L-ACNC: 3.6 UIU/ML (ref 0.45–4.5)
WBC # BLD AUTO: 6.72 THOUSAND/UL (ref 4.31–10.16)

## 2022-06-06 PROCEDURE — 84439 ASSAY OF FREE THYROXINE: CPT | Performed by: PSYCHIATRY & NEUROLOGY

## 2022-06-06 PROCEDURE — 83036 HEMOGLOBIN GLYCOSYLATED A1C: CPT | Performed by: PSYCHIATRY & NEUROLOGY

## 2022-06-06 PROCEDURE — 80053 COMPREHEN METABOLIC PANEL: CPT | Performed by: PSYCHIATRY & NEUROLOGY

## 2022-06-06 PROCEDURE — 84443 ASSAY THYROID STIM HORMONE: CPT | Performed by: PSYCHIATRY & NEUROLOGY

## 2022-06-06 PROCEDURE — 85025 COMPLETE CBC W/AUTO DIFF WBC: CPT | Performed by: PSYCHIATRY & NEUROLOGY

## 2022-06-06 PROCEDURE — 99231 SBSQ HOSP IP/OBS SF/LOW 25: CPT | Performed by: PHYSICIAN ASSISTANT

## 2022-06-06 PROCEDURE — 80061 LIPID PANEL: CPT | Performed by: PSYCHIATRY & NEUROLOGY

## 2022-06-06 RX ORDER — PHENELZINE SULFATE 15 MG/1
15 TABLET ORAL DAILY
Status: DISCONTINUED | OUTPATIENT
Start: 2022-06-07 | End: 2022-06-07

## 2022-06-06 RX ORDER — LANOLIN ALCOHOL/MO/W.PET/CERES
CREAM (GRAM) TOPICAL 4 TIMES DAILY PRN
Status: DISCONTINUED | OUTPATIENT
Start: 2022-06-06 | End: 2022-06-15 | Stop reason: HOSPADM

## 2022-06-06 RX ADMIN — LITHIUM CARBONATE 450 MG: 300 CAPSULE, GELATIN COATED ORAL at 16:09

## 2022-06-06 RX ADMIN — MELATONIN TAB 3 MG 3 MG: 3 TAB at 20:59

## 2022-06-06 RX ADMIN — LITHIUM CARBONATE 450 MG: 300 CAPSULE, GELATIN COATED ORAL at 07:55

## 2022-06-06 RX ADMIN — ARIPIPRAZOLE 2 MG: 2 TABLET ORAL at 08:47

## 2022-06-06 RX ADMIN — LITHIUM CARBONATE 450 MG: 300 CAPSULE, GELATIN COATED ORAL at 12:46

## 2022-06-06 NOTE — PROGRESS NOTES
06/06/22 1100   Activity/Group Checklist   Group   (recovery group)   Attendance Attended   Attendance Duration (min) 31-45   Interactions Interacted appropriately   Affect/Mood Appropriate   Goals Achieved Discussed self-esteem issues; Able to listen to others; Able to engage in interactions; Able to self-disclose; Able to recieve feedback

## 2022-06-06 NOTE — TREATMENT TEAM
06/06/22 0838   Team Meeting   Meeting Type Daily Rounds   Team Members Present   Team Members Present Physician;Nurse;   Physician Team Member Dr Sobeida Vera Team Member PAVEL COLLINS [facility] Management Team Member Otilia   Patient/Family Present   Patient Present No   Patient's Family Present No     [id number] [phone] 81, brought in by EMS, admitted due to increased depression and agitation  Continue assessment  Continue to monitor

## 2022-06-06 NOTE — NURSING NOTE
Pt denies SI, HI, AH and VH  Pt is calm and cooperative  Pt is visble in the milieu and social with peers  Pt seen by medical for complaints of left heel pain  Pt in behavioral control  Medication and meal compliant  Will monitor

## 2022-06-06 NOTE — PROGRESS NOTES
[facility]  Progress Note - Makenna Self 2000, 19 y o  male MRN: [id number]  Unit/Bed#: [facility] 374-01 Encounter: [id number]  Primary Care Provider: Jun Tovar MD   Date and time admitted to hospital: 2022  4:10 PM    Dry skin  Assessment & Plan  · Patient complaining of dry skin of bilateral feet  States feet hurt while walking, describes pain as burning  · On physical exam, patient with areas of thick, white, cracked skin over bilateral feet  R>L  · Will order eucerin cream      Nurse Coordination of Care Discussion: discussed assessment and plan with primary RN    Discussions with Specialists or Other Care Team Provider: none    Education and Discussions with Family / Patient: answered patients questions to the best of my ability    Time Spent for Care: 20 minutes  More than 50% of total time spent on counseling and coordination of care as described above  Current Length of Stay: 3 day(s)    Code Status: Level 1 - Full Code      Subjective:   Patient complaining of pain soles of feet bilaterally  Describes pain as burning  States heels are very dry and cracked  Reports pain with ambulation    Objective:     Vitals:   Temp (24hrs), Av 2 °F (36 2 °C), Min:96 9 °F (36 1 °C), Max:97 5 °F (36 4 °C)    Temp:  [96 9 °F (36 1 °C)-97 5 °F (36 4 °C)] 97 5 °F (36 4 °C)  HR:  [83-94] 83  Resp:  [16] 16  BP: (145-150)/(65-66) 145/65  SpO2:  [98 %-100 %] 98 %  Body mass index is 51 5 kg/m²  Physical Exam:     Physical Exam  Musculoskeletal:        Feet:    Feet:      Right foot:      Skin integrity: Dry skin present  Left foot:      Skin integrity: Dry skin present             Additional Data:     Labs:    Results from last 7 days   Lab Units 22  0616   WBC Thousand/uL 6 72   HEMOGLOBIN g/dL 13 9   HEMATOCRIT % 43 4   PLATELETS Thousands/uL 227   NEUTROS PCT % 57   LYMPHS PCT % 31   MONOS PCT % 8   EOS PCT % 3     Results from last 7 days   Lab Units 06/06/22  0616   SODIUM mmol/L 139   POTASSIUM mmol/L 4 1   CHLORIDE mmol/L 105   CO2 mmol/L 26   BUN mg/dL 17   CREATININE mg/dL 1 03   ANION GAP mmol/L 8   CALCIUM mg/dL 9 4   ALBUMIN g/dL 4 3   TOTAL BILIRUBIN mg/dL 1 09   ALK PHOS U/L 63   ALT U/L 128*   AST U/L 68*   GLUCOSE RANDOM mg/dL 105*                     * I Have Reviewed All Lab Data Listed Above  * Additional Pertinent Lab Tests Reviewed:  All Labs Within Last 24 Hours Reviewed    Imaging:    Imaging Reports Reviewed Today Include: no imaging reviewed today      Last 24 Hours Medication List:   Current Facility-Administered Medications   Medication Dose Route Frequency Provider Last Rate    acetaminophen  650 mg Oral Q6H PRN Mila Pittman PA-C      [facility]      acetaminophen  975 mg Oral Q6H PRN [facility]      ARIPiprazole  2 mg Oral Daily [place], [place]      haloperidol lactate  2 5 mg Intramuscular Q6H PRN Max 4/day [facility]      And    LORazepam  1 mg Intramuscular Q6H PRN Max 4/day [facility]      And    benztropine  0 5 mg Intramuscular Q6H PRN Max 4/day [facility]      haloperidol lactate  5 mg Intramuscular Q4H PRN Max 4/day [facility]      And    LORazepam  2 mg Intramuscular Q4H PRN Max 4/day [facility]      And    benztropine  1 mg Intramuscular Q4H PRN Max 4/day [facility]      benztropine  1 mg Intramuscular Q4H PRN Max 6/day [facility]      benztropine  1 mg Oral Q4H PRN Max 6/day [facility]      haloperidol  2 mg Oral Q4H PRN Max 6/day [facility]      haloperidol  5 mg Oral Q6H PRN Max 4/day [place], [place]      haloperidol  5 mg Oral Q4H PRN Max 4/day [facility]      hydrOXYzine HCL  25 mg Oral Q6H PRN Max 4/day [facility]      hydrOXYzine HCL  50 mg Oral Q4H PRN Max 4/day Guillermo Hough PA-C      Or    LORazepam  1 mg Intramuscular Q4H PRN Guillermo Hough PA-C      lithium carbonate  450 mg Oral TID With Meals Guillermo Hough PA-C      LORazepam  1 mg Oral Q4H PRN Max 6/day [place], [place]      Or    LORazepam  2 mg Intramuscular Q6H PRN Max 3/day [facility]      melatonin  3 mg Oral HS [place], [place]      [START ON 6/7/2022] NON FORMULARY  15 mg Oral Daily Luiz López MD      propranolol  10 mg Oral Q8H PRN Marly Martinez PA-C          Today, Patient Was Seen By: Analisa Hassan PA-C      ** Please Note: Dictation voice to text software may have been used in the creation of this document   **

## 2022-06-06 NOTE — PROGRESS NOTES
Pharmacy Medication Education Group Note     Patient came to group stating they wanted more information on their new proposed medication, but they could not remember the name  Patient was educated on phenelzine and in particular the mechanism whereby it inhibits the last major step of serotonin and norepinephrine break down, which makes it have more risk than other medications due to a potential unsafe rise in serotonin and norepinephrine leading to serotonin syndrome and hypertensive crisis  Patient asked this writer if the medication interacted with alcohol  Patient was educated that while other medications are more tolerable for alcohol consumption, Lithium and MAOIs are very dangerous when consumed with alcohol  The mechanism for each interaction was explained  The patient stated multiple times throughout the interaction that they are not an alcoholic but that they drink around 15 beers in a night every once in a while  It was once again explained to the patient that binging alcohol would be an extremely dangerous practice that would potentially lead to ICU level care if consumed with lithium and an MAOI  Patient was behaviorally appropriate while in group         Levon Cooks, PharmD, BCPP, Clinical Pharmacist - Psychiatry

## 2022-06-06 NOTE — NURSING NOTE
Patient is in the community and social with peers  Patient was watching TV in dayroom  Patient attended group and was compliant with scheduled medication  Patient inquired about taking a potasium pill and staff expressed the importance of labs and they would need to be completed in order to know if potasium is needed  Patient reported that his veins are difficult to access and have always been that way  Patient reported anxiety, but manageable  Patient did not demonstrate any impulsive behaviors  Staff to maintain continuous rounding for safety and support  -

## 2022-06-06 NOTE — PLAN OF CARE
Problem: Ineffective Coping  Goal: Identifies ineffective coping skills  Outcome: Progressing     Problem: Depression  Goal: Treatment Goal: Demonstrate behavioral control of depressive symptoms, verbalize feelings of improved mood/affect, and adopt new coping skills prior to discharge  Outcome: Progressing  Goal: Verbalize thoughts and feelings  Description: Interventions:  - Assess and re-assess patient's level of risk   - Engage patient in 1:1 interactions, daily, for a minimum of 15 minutes   - Encourage patient to express feelings, fears, frustrations, hopes   Outcome: Progressing  Goal: Refrain from harming self  Description: Interventions:  - Monitor patient closely, per order   - Supervise medication ingestion, monitor effects and side effects   Outcome: Progressing  Goal: Refrain from isolation  Description: Interventions:  - Develop a trusting relationship   - Encourage socialization   Outcome: Progressing  Goal: Refrain from self-neglect  Outcome: Progressing  Goal: Complete daily ADLs, including personal hygiene independently, as able  Description: Interventions:  - Observe, teach, and assist patient with ADLS  -  Monitor and promote a balance of rest/activity, with adequate nutrition and elimination   Outcome: Progressing     Problem: Anxiety  Goal: Anxiety is at manageable level  Description: Interventions:  - Assess and monitor patient's anxiety level  - Monitor for signs and symptoms (heart palpitations, chest pain, shortness of breath, headaches, nausea, feeling jumpy, restlessness, irritable, apprehensive)  - Collaborate with interdisciplinary team and initiate plan and interventions as ordered    - [place] patient to unit/surroundings  - Explain treatment plan  - Encourage participation in care  - Encourage verbalization of concerns/fears  - Identify coping mechanisms  - Assist in developing anxiety-reducing skills  - Administer/offer alternative therapies  - Limit or eliminate stimulants  Outcome: Progressing     Problem: Risk for Violence/Aggression Toward Others  Goal: Treatment Goal: Refrain from acts of violence/aggression during length of stay, and demonstrate improved impulse control at the time of discharge  Outcome: Progressing

## 2022-06-06 NOTE — PROGRESS NOTES
Progress Note - R Arturo Palomares [age] y o  male MRN: [id number]  Unit/Bed#: [facility] 374-01 Encounter: [id number]    Assessment/Plan   Principal Problem:    Major depression, recurrent ([place] [id number] )  Active Problems:    Medical clearance for psychiatric admission    KEYLA (obstructive sleep apnea)    Morbid obesity with BMI of 50 0-59 9, adult ([facility] 75 )    Alcohol abuse  Patient continues to refuse the taking Lamictal and Abilify  Remains depressed and withdrawn  Chronic suicidal ideas and hopelessness  Patient has had depression for over 12 years without much results from active treatment including a recent T MS  He also was drinking heavily at night to help him sleep and to help forget   He lives with his mother who also suffers from depression  His father is  who has a diagnosis of bipolar disorder  Unable to work due to his depression at this time but is hopeful to return to work should he improve  Depression has tried all kinds of categories of antidepressant except MAO inhibitors which would be the next treatment therapy    Behavior over the last 24 hours:  unchanged  Sleep: normal  Appetite: normal  Medication side effects: No  ROS: no complaints    Mental Status Evaluation:  Appearance:  age appropriate   Behavior:  guarded and withdrawn   Speech:  normal pitch and normal volume   Mood:  anxious and depressed   Affect:  blunted and constricted   Thought Process:  tangential   Thought Content:  normal   Perceptual Disturbances: None   Risk Potential: Suicidal Ideations present    No active plans or intent in the hospital  Homicidal Ideations none  Potential for Aggression No   Sensorium:  person, place, and situation   Memory:  recent and remote memory grossly intact   Consciousness:  alert and awake    Attention: attention span appeared shorter than expected for age   Insight:  fair   Judgment: fair   Gait/Station: normal gait/station   Motor Activity: no abnormal movements   No changeProgress Toward Goals:  No change    Recommended Treatment: Continue with group therapy, milieu therapy and occupational therapy  Risks, benefits and possible side effects of Medications:   Risks, benefits, and possible side effects of medications explained to patient and patient verbalizes understanding  Medications: planned medication changes: Will start Nardil after being off Cymbalta 10 days     Lithium level pending  Patient has been started on a low tyramine diet  Labs: I have personally reviewed all pertinent laboratory/tests results  Most Recent Labs:   Lab Results   Component Value Date    WBC 6 72 06/06/2022    RBC 4 86 06/06/2022    HGB 13 9 06/06/2022    HCT 43 4 06/06/2022     06/06/2022    RDW 13 2 06/06/2022    NEUTROABS 3 90 06/06/2022    SODIUM 139 06/06/2022    K 4 1 06/06/2022     06/06/2022    CO2 26 06/06/2022    BUN 17 06/06/2022    CREATININE 1 03 06/06/2022    GLUC 105 (H) 06/06/2022    GLUF 105 (H) 06/06/2022    CALCIUM 9 4 06/06/2022    AST 68 (H) 06/06/2022     (H) 06/06/2022    ALKPHOS 63 06/06/2022    TP 7 0 06/06/2022    ALB 4 3 06/06/2022    TBILI 1 09 06/06/2022    CHOLESTEROL 186 06/06/2022    HDL 43 06/06/2022    TRIG 101 06/06/2022    LDLCALC 123 06/06/2022    NONHDLC 143 06/06/2022    LITHIUM <0 2 (L) 06/01/2022    XDR5BSEAIQMC 3 600 06/06/2022    HGBA1C 4 8 07/02/2019    EAG 91 07/02/2019       Counseling / Coordination of Care  Total floor / unit time spent today 25 minutes  Greater than 50% of total time was spent with the patient and / or family counseling and / or coordination of care   A description of the counseling / coordination of care:

## 2022-06-06 NOTE — NURSING NOTE
Lab was contacted to confirm if the lithium level could be added to the blood draw from this morning  Lab stated that the lithium level needs to be a gold top and there was not a gold top sent down in the morning, so there needs to a new collection in the morning

## 2022-06-06 NOTE — NURSING NOTE
Pt stated he wants to continue to have a "few drinks" after he is d/c but does not want to be an alcoholic  Pt informed of new medication that will be started and to speak to his psychiatrist in regards to drinking and taking Nardil  Will monitor

## 2022-06-06 NOTE — DISCHARGE INSTR - OTHER ORDERS
You will be discharged with mom to home located at [facility] [place] today at [address]  Your discharge meds being sent to [facility] on [address], [place]     After discharge, if you find your coping skills are not as effective and you continue to feel distressed please call Oswald Arrieta services are available 24 hours a day by calling [facility] ([facility]) AT [place] at [phone], and [address] : [phone]    [facility] : [id number]     If you feel you are a danger to yourself or others please contact 911 or go to nearest Emergency room to seek immediate help

## 2022-06-06 NOTE — DISCHARGE INSTR - APPOINTMENTS
Gumaro Salgado or Kristan, our Kylie and Rocio, will be calling you after your discharge, on the phone number that you provided  They will be available as an additional support, if needed  If you wish to speak with one of them, you may contact Kirstie Valentine at [phone] or Yuri Trent at [phone]

## 2022-06-06 NOTE — TREATMENT TEAM
06/06/22 1412   Team Meeting   Meeting Type Tx Team Meeting   Initial Conference Date 06/03/22   Next Conference Date 07/02/22   Team Members Present   Team Members Present Physician;Nurse;   Physician Team Member Gross   Nursing Team Member PAVEL COLLINS [facility] Management Team Member Otilia   Patient/Family Present   Patient Present Yes   Patient's Family Present No     Treatment plan goals reviewed  Pt is in agreement with the plan and signed Tx plan  Continue to monitor

## 2022-06-06 NOTE — ASSESSMENT & PLAN NOTE
· Patient complaining of dry skin of bilateral feet  States feet hurt while walking, describes pain as burning  · On physical exam, patient with areas of thick, white, cracked skin over bilateral feet   R>L  · Will order eucerin cream

## 2022-06-07 LAB — LITHIUM SERPL-SCNC: 0.5 MMOL/L (ref 0.6–1.2)

## 2022-06-07 PROCEDURE — 80178 ASSAY OF LITHIUM: CPT | Performed by: PSYCHIATRY & NEUROLOGY

## 2022-06-07 RX ORDER — PHENELZINE SULFATE 15 MG/1
15 TABLET ORAL DAILY
Status: DISCONTINUED | OUTPATIENT
Start: 2022-06-09 | End: 2022-06-08

## 2022-06-07 RX ORDER — LANOLIN ALCOHOL/MO/W.PET/CERES
CREAM (GRAM) TOPICAL 3 TIMES DAILY PRN
Status: CANCELLED | OUTPATIENT
Start: 2022-06-07

## 2022-06-07 RX ORDER — LITHIUM CARBONATE 300 MG/1
600 CAPSULE ORAL 2 TIMES DAILY WITH MEALS
Status: DISCONTINUED | OUTPATIENT
Start: 2022-06-07 | End: 2022-06-15 | Stop reason: HOSPADM

## 2022-06-07 RX ORDER — LITHIUM CARBONATE 300 MG/1
300 CAPSULE ORAL DAILY
Status: DISCONTINUED | OUTPATIENT
Start: 2022-06-07 | End: 2022-06-15 | Stop reason: HOSPADM

## 2022-06-07 RX ADMIN — LITHIUM CARBONATE 600 MG: 300 CAPSULE, GELATIN COATED ORAL at 17:10

## 2022-06-07 RX ADMIN — ARIPIPRAZOLE 2 MG: 2 TABLET ORAL at 08:11

## 2022-06-07 RX ADMIN — LITHIUM CARBONATE 450 MG: 300 CAPSULE, GELATIN COATED ORAL at 08:11

## 2022-06-07 RX ADMIN — MELATONIN TAB 3 MG 3 MG: 3 TAB at 21:12

## 2022-06-07 RX ADMIN — LITHIUM CARBONATE 300 MG: 300 CAPSULE, GELATIN COATED ORAL at 09:50

## 2022-06-07 NOTE — PROGRESS NOTES
[facility] Group Note     06/06/22 1000   Activity/Group Checklist   Group Life Skills  (Parts of a House)   Attendance Attended   Attendance Duration (min) 46-60   Interactions Interacted appropriately   Affect/Mood Appropriate;Bright   Goals Achieved Identified feelings; Identified triggers; Discussed coping strategies; Discussed self-esteem issues; Able to listen to others; Able to engage in interactions; Able to reflect/comment on own behavior;Able to self-disclose; Able to recieve feedback; Able to give feedback to another

## 2022-06-07 NOTE — PROGRESS NOTES
06/07/22 1100   Activity/Group Checklist   Group   (recovery group)   Attendance Attended   Attendance Duration (min) 46-60   Interactions Interacted appropriately   Affect/Mood Appropriate   Goals Achieved Discussed self-esteem issues; Able to listen to others; Able to engage in interactions; Able to reflect/comment on own behavior;Able to self-disclose; Able to recieve feedback

## 2022-06-07 NOTE — PROGRESS NOTES
[facility] Group Note     06/06/22 1300   Activity/Group Checklist   Group Other (Comment)  (GUERO)   Attendance Attended   Attendance Duration (min) 31-45   Interactions Interacted appropriately   Affect/Mood Appropriate;Bright   Goals Achieved Identified feelings; Discussed coping strategies; Able to listen to others; Able to engage in interactions; Able to reflect/comment on own behavior;Able to self-disclose; Able to recieve feedback; Able to give feedback to another

## 2022-06-07 NOTE — PLAN OF CARE
Problem: Ineffective Coping  Goal: Identifies ineffective coping skills  Outcome: Progressing     Problem: Depression  Goal: Treatment Goal: Demonstrate behavioral control of depressive symptoms, verbalize feelings of improved mood/affect, and adopt new coping skills prior to discharge  Outcome: Progressing  Goal: Verbalize thoughts and feelings  Description: Interventions:  - Assess and re-assess patient's level of risk   - Engage patient in 1:1 interactions, daily, for a minimum of 15 minutes   - Encourage patient to express feelings, fears, frustrations, hopes   Outcome: Progressing  Goal: Refrain from harming self  Description: Interventions:  - Monitor patient closely, per order   - Supervise medication ingestion, monitor effects and side effects   Outcome: Progressing  Goal: Refrain from isolation  Description: Interventions:  - Develop a trusting relationship   - Encourage socialization   Outcome: Progressing  Goal: Refrain from self-neglect  Outcome: Progressing  Goal: Complete daily ADLs, including personal hygiene independently, as able  Description: Interventions:  - Observe, teach, and assist patient with ADLS  -  Monitor and promote a balance of rest/activity, with adequate nutrition and elimination   Outcome: Progressing     Problem: Anxiety  Goal: Anxiety is at manageable level  Description: Interventions:  - Assess and monitor patient's anxiety level  - Monitor for signs and symptoms (heart palpitations, chest pain, shortness of breath, headaches, nausea, feeling jumpy, restlessness, irritable, apprehensive)  - Collaborate with interdisciplinary team and initiate plan and interventions as ordered    - [place] patient to unit/surroundings  - Explain treatment plan  - Encourage participation in care  - Encourage verbalization of concerns/fears  - Identify coping mechanisms  - Assist in developing anxiety-reducing skills  - Administer/offer alternative therapies  - Limit or eliminate stimulants  Outcome: Progressing     Problem: Risk for Violence/Aggression Toward Others  Goal: Treatment Goal: Refrain from acts of violence/aggression during length of stay, and demonstrate improved impulse control at the time of discharge  Outcome: Progressing

## 2022-06-07 NOTE — NURSING NOTE
Patient is in the community and social with peers  Patient is calm and cooperative and makes needs known  Patient completed ADLs with encouragement  Patient stated, "I lost my sense of smell when I had COVID, so I can't smell myself " Patient was compliant with scheduled medications  Patient attended group  Patient denies all other symptoms at this time  Staff to maintain continuous rounding for safety and support

## 2022-06-07 NOTE — PROGRESS NOTES
[facility] Group Note     06/07/22 1300   Activity/Group Checklist   Group Life Skills  (Positivity and Mindful Awareness)   Attendance Attended   Attendance Duration (min) 46-60   Interactions Interacted appropriately   Affect/Mood Appropriate;Calm   Goals Achieved Identified feelings; Identified triggers; Discussed coping strategies; Able to listen to others; Able to engage in interactions; Able to reflect/comment on own behavior;Able to self-disclose; Able to recieve feedback; Able to give feedback to another

## 2022-06-07 NOTE — PLAN OF CARE
Problem: Depression  Goal: Attend and participate in unit activities, including therapeutic, recreational, and educational groups  Description: Interventions:  - Provide therapeutic and educational activities daily, encourage attendance and participation, and document same in the medical record   Outcome: Progressing

## 2022-06-07 NOTE — NURSING NOTE
Pt denies psych symptoms  Pt and pt's mother upset about conflicting information being giving regarding Nardil  Pt stated "My mom and Dr Corbin Mcclure said I can have hotdogs, sauerkraut and certain beers while I take this medication and other staff are telling me I can't  Do you understand how frustrating that is like imagine I was going through this without my mom  I want to still be an occasional drinker  I'm just tired of all the mis information you guys are giving me and the bickering " Pt informed his concerns would be passed along and the treatment team will try to clear up any confusion for him  Medication and meal complaint  Will monitor

## 2022-06-07 NOTE — PROGRESS NOTES
06/06/22 1223   Patient Intake   Living Arrangement Lives with someone  (Pt 19, White male, single, unemployed, lives with mother in a house  Pt has completed some college )   Can patient return home? Yes   Address to be Discharge to: [address] [place] [address]   Patient's Telephone Number [phone]   Access to Firearms No   Work History 0[employer] No  [employer] sophomore  (Some college)   Admission Status   Status of Admission 201  (admitted due to increased depression and agitation )   Patient History   Treatment History First admission no prior admission   Currently in Treatment Yes   Current Psychiatrist/Therapist Jose Faria Denied legal issues   Substance Abuse Yes (See [facility] History section for detail)  (Admits drinking 15 cans (12 oz) of beer a day  Started drinking since the age of 22 )   Crisis Info   Release of Information Signed Yes  (Mother, [facility] and [facility] family medicine)       Pt presented calm and cooperative during the intake assessment

## 2022-06-07 NOTE — PROGRESS NOTES
Progress Note - R Arturo Palomares [age] y o  male MRN: [id number]  Unit/Bed#: [facility] 374-01 Encounter: [id number]    Assessment/Plan   Principal Problem:    Major depression, recurrent ([address] )  Active Problems:    Medical clearance for psychiatric admission    KEYLA (obstructive sleep apnea)    Morbid obesity with BMI of 50 0-59 9, adult (HCC)    Alcohol abuse    Dry skin  To the psych unit better and is out of his room does interact with other patients and staff and has attends groups  Remains depressed but hopeful that medication will help  Continues to have ongoing suicide his but no active plans or intent  Unable start the Nardil, MAO inhibitor for another 2 days because of the washout period from the Cymbalta is not finished  Discussed with the patient today about not being able to drink alcohol while he is on an MAO inhibitor and he reluctantly agrees but says it's [place], my agent I will be able to drink anymore and     Behavior over the last 24 hours:  unchanged  Sleep: normal  Appetite: normal  Medication side effects: No  ROS: no complaints    Mental Status Evaluation:  Appearance:  age appropriate   Behavior:  guarded   Speech:  normal pitch and normal volume   Mood:  anxious and depressed   Affect:  blunted and constricted   Thought Process:  tangential   Thought Content:  normal   Perceptual Disturbances: None   Risk Potential: Suicidal Ideations none  Homicidal Ideations none  Potential for Aggression No   Sensorium:  person, place, and situation   Memory:  recent and remote memory grossly intact   Consciousness:  alert and awake    Attention: attention span appeared shorter than expected for age   Insight:  fair   Judgment: fair   Gait/Station: normal gait/station   Motor Activity: no abnormal movements     Progress Toward Goals:  No change    Recommended Treatment: Continue with group therapy, milieu therapy and occupational therapy        Risks, benefits and possible side effects of Medications:   Risks, benefits, and possible side effects of medications explained to patient and patient verbalizes understanding  Medications: all current active meds have been reviewed and planned medication changes: Increase lithium to 1500 mg a day due to low lithium level of 0 5 i  Labs: I have personally reviewed all pertinent laboratory/tests results  Most Recent Labs:   Lab Results   Component Value Date    WBC 6 72 06/06/2022    RBC 4 86 06/06/2022    HGB 13 9 06/06/2022    HCT 43 4 06/06/2022     06/06/2022    RDW 13 2 06/06/2022    NEUTROABS 3 90 06/06/2022    SODIUM 139 06/06/2022    K 4 1 06/06/2022     06/06/2022    CO2 26 06/06/2022    BUN 17 06/06/2022    CREATININE 1 03 06/06/2022    GLUC 105 (H) 06/06/2022    GLUF 105 (H) 06/06/2022    CALCIUM 9 4 06/06/2022    AST 68 (H) 06/06/2022     (H) 06/06/2022    ALKPHOS 63 06/06/2022    TP 7 0 06/06/2022    ALB 4 3 06/06/2022    TBILI 1 09 06/06/2022    CHOLESTEROL 186 06/06/2022    HDL 43 06/06/2022    TRIG 101 06/06/2022    LDLCALC 123 06/06/2022    NONHDLC 143 06/06/2022    LITHIUM 0 5 (L) 06/07/2022    [id number] 3 600 06/06/2022    FREET4 1 10 06/06/2022    HGBA1C 4 9 06/06/2022    EAG 94 06/06/2022       Counseling / Coordination of Care  Total floor / unit time spent today 25 minutes  Greater than 50% of total time was spent with the patient and / or family counseling and / or coordination of care   A description of the counseling / coordination of care:

## 2022-06-07 NOTE — TREATMENT TEAM
06/07/22 0841   Team Meeting   Meeting Type Daily Rounds   Team Members Present   Team Members Present Physician;Nurse;   Physician Team Member Gross   Nursing Team Member PAVEL COLLINS [facility] Management Team Member Otilia   Patient/Family Present   Patient Present No   Patient's Family Present No     Pt visible, and social on the unit  Calm and compliant with treatment and meds  Pt remains with poor insights about meds and drinking  Continue med management - safety precaution start Nardil in 2 day  Discharge to be determined  Continue to monitor

## 2022-06-08 RX ORDER — PHENELZINE SULFATE 15 MG/1
15 TABLET ORAL
Status: DISCONTINUED | OUTPATIENT
Start: 2022-06-10 | End: 2022-06-13

## 2022-06-08 RX ORDER — PHENELZINE SULFATE 15 MG/1
15 TABLET ORAL DAILY
Status: DISCONTINUED | OUTPATIENT
Start: 2022-06-10 | End: 2022-06-08

## 2022-06-08 RX ADMIN — LITHIUM CARBONATE 600 MG: 300 CAPSULE, GELATIN COATED ORAL at 07:40

## 2022-06-08 RX ADMIN — MELATONIN TAB 3 MG 3 MG: 3 TAB at 22:18

## 2022-06-08 RX ADMIN — LITHIUM CARBONATE 300 MG: 300 CAPSULE, GELATIN COATED ORAL at 08:17

## 2022-06-08 RX ADMIN — ARIPIPRAZOLE 2 MG: 2 TABLET ORAL at 08:16

## 2022-06-08 RX ADMIN — LITHIUM CARBONATE 600 MG: 300 CAPSULE, GELATIN COATED ORAL at 16:55

## 2022-06-08 NOTE — PROGRESS NOTES
06/08/22 1100   Activity/Group Checklist   Group   (recovery group)   Attendance Attended   Attendance Duration (min) 46-60   Interactions Interacted appropriately   Affect/Mood Appropriate   Goals Achieved Identified feelings; Identified triggers; Discussed self-esteem issues; Discussed coping strategies; Identified resources and support systems; Able to listen to others; Able to engage in interactions; Able to reflect/comment on own behavior;Able to self-disclose; Able to recieve feedback

## 2022-06-08 NOTE — PLAN OF CARE
Pt states his depression is less as he is engaging in more activities and group on the unit  Discussed about patient's plan to drink alcohol  Pt states he has been curious about alcohol use since he will be starting on MAOI  "I just wanted to know more about alcohol use  But I am sure, I will avoid drinking if it does not help"  Pt was encouraged complying with meds and meals  Pt denied having Si, HI and AVH  Continue to monitor       Problem: DISCHARGE PLANNING  Goal: Discharge to home or other facility with appropriate resources  Description: INTERVENTIONS:  - Identify barriers to discharge w/patient and caregiver  - Arrange for needed discharge resources and transportation as appropriate  - Identify discharge learning needs (meds, wound care, etc )  - Arrange for interpretive services to assist at discharge as needed  - Refer to Case Management Department for coordinating discharge planning if the patient needs post-hospital services based on physician/advanced practitioner order or complex needs related to functional status, cognitive ability, or social support system  Outcome: Progressing

## 2022-06-08 NOTE — NURSING NOTE
Pt's behavioral status remains unchanged  Visible mostly in milieu; following unit rules appropriate with no extensive prompting or redirection  No active complaints or safety concerns at this time  Mood is stable, flat but playing games with peers

## 2022-06-08 NOTE — NURSING NOTE
Pt denies SI, HI and psychosis symptoms, appears pressured on approach  Compliant with meds  Makes needs known with no difficulty  Visible in bedroom and social within milieu  Behaviorally appropriate with minimal prompting  Current treatment plan is effective  Pt provided tyramine diet education papers which pt accepted

## 2022-06-08 NOTE — PROGRESS NOTES
[facility] Group Note     06/08/22 1000   Activity/Group Checklist   Group Life Skills  (Coping Skills [employer])   Attendance Attended   Attendance Duration (min) 46-60   Interactions Interacted appropriately   Affect/Mood Appropriate;Bright   Goals Achieved Identified feelings; Identified triggers; Discussed coping strategies; Able to listen to others; Able to engage in interactions; Able to reflect/comment on own behavior;Able to self-disclose; Able to recieve feedback; Able to give feedback to another

## 2022-06-08 NOTE — QUICK NOTE
STACEY was asked to evaluate the patient's BP trend and need for antihypertensive prior to starting MAO therapy (Nardil) on [address]  Patient's BP trends average  -140/ DBP 65-75 which is Stage I HTN and usually only rx a medical therapy for this class if there has already been an ASCVD which the patient does not have  Obviously weight loss would be most number one beneficial and natural way for this patient to achieve his BP goal which is 120/80  Also, trialing CPAP qhs again would also benefit the BP  I ordered this to be attempted with the RT tonight  At this time patient doesn't need antihypertensive  Would start nardil on 6/10/22 and monitor BP trend  Sometimes the Nardil medication can actually cause orthostatic hypotension so it would be better first to see what the Nardil does to the BP trend and react to that in time with possibly addition of Norvasc 5 mg po qd if necessary to reach goal BP  Norvasc can be uptitrated to 10 mg qd maximum then started on secondary antihypertensive which would probably be best option for second line would be Lopressor which starts at 12 5 mg bid  Medicine will follow along peripherally for monitoring the BP trend and of course be on call for primary service needs and RN concerns

## 2022-06-08 NOTE — PROGRESS NOTES
Progress Note - R Arturo Palomares [age] y o  male MRN: [id number]  Unit/Bed#: [facility] 374-01 Encounter: [id number]    Assessment/Plan   Principal Problem:    Major depression, recurrent ([address] )  Active Problems:    Medical clearance for psychiatric admission    KEYLA (obstructive sleep apnea)    Morbid obesity with BMI of 50 0-59 9, adult ([facility])    Alcohol abuse    Dry skin  Patient remains ambivalent about taking a meal inhibitors due to dietary restrictions especially the use of alcohol and also mentions hot thoughts frequently  Has argued with the staff on off about this  The patient reports his depression is not as intense now he is in structured environment and is expected to get out of bed and attend activities which he does  He still has negative thoughts and is not sure how he will feel once he is actually home and where he lives with his mother who is quite supportive  Patient does not historically have an alcohol problem although when he was severely depressed for the couple weeks prior to this admission he was drinking up to 15 beers a day  But he is interested in drinking socially when he dose of go home  On the positive side he lives with his mother who was quite supportive and hands the been with him through this crisis of depression and through a his history of depression  His mother also had been on MAO inhibitors in the past and that can be support to him while he is taking this medications  The had discuss other options be side and medial inhibitors including ECT which she has refused to consider  Patient also concerned about having lithium induced tremors which he has had in the past and the dose had to be reduce because of it      Behavior over the last 24 hours:  unchanged  Sleep: normal  Appetite: normal  Medication side effects: No  ROS: no complaints    Mental Status Evaluation:  Appearance:  age appropriate   Behavior:  guarded   Speech:  normal pitch and normal volume Mood:  anxious and depressed   Affect:  blunted and constricted   Thought Process:  tangential   Thought Content:  normal   Perceptual Disturbances: None   Risk Potential: Suicidal Ideations none  Homicidal Ideations none  Potential for Aggression No   Sensorium:  person, place, and situation   Memory:  recent and remote memory grossly intact   Consciousness:  alert and awake    Attention: attention span appeared shorter than expected for age   Insight:  fair   Judgment: fair   Gait/Station: normal gait/station   Motor Activity: no abnormal movements     Progress Toward Goals:  Slowly improving    Recommended Treatment: Continue with group therapy, milieu therapy and occupational therapy  Also ask Medical to see the patient regarding his the hypertension  Risks, benefits and possible side effects of Medications:   Risks, benefits, and possible side effects of medications explained to patient and patient verbalizes understanding  Medications: planned medication changes: Will be starting Nardil 15 mg t i d  On 09/10 which is 2 weeks off his the duloxetine  Labs: I have personally reviewed all pertinent laboratory/tests results     Most Recent Labs:   Lab Results   Component Value Date    WBC 6 72 06/06/2022    RBC 4 86 06/06/2022    HGB 13 9 06/06/2022    HCT 43 4 06/06/2022     06/06/2022    RDW 13 2 06/06/2022    NEUTROABS 3 90 06/06/2022    SODIUM 139 06/06/2022    K 4 1 06/06/2022     06/06/2022    CO2 26 06/06/2022    BUN 17 06/06/2022    CREATININE 1 03 06/06/2022    GLUC 105 (H) 06/06/2022    GLUF 105 (H) 06/06/2022    CALCIUM 9 4 06/06/2022    AST 68 (H) 06/06/2022     (H) 06/06/2022    ALKPHOS 63 06/06/2022    TP 7 0 06/06/2022    ALB 4 3 06/06/2022    TBILI 1 09 06/06/2022    CHOLESTEROL 186 06/06/2022    HDL 43 06/06/2022    TRIG 101 06/06/2022    LDLCALC 123 06/06/2022    NONHDLC 143 06/06/2022    LITHIUM 0 5 (L) 06/07/2022    [id number] 3 600 06/06/2022    FREET4 1 10 06/06/2022    HGBA1C 4 9 06/06/2022    EAG 94 06/06/2022       Counseling / Coordination of Care  Total floor / unit time spent today 25 minutes  Greater than 50% of total time was spent with the patient and / or family counseling and / or coordination of care   A description of the counseling / coordination of care:

## 2022-06-08 NOTE — NURSING NOTE
Pt visibile in hallway walking frequently throughout the evening  Affect is constricted and dysphoric  Affect broadened upon speaking with patient about his frustrations over new MAOI medication and eating restrictions  Pt explained the pros and cons of medication and encouraged to seek out more information on food restrictions for clarification and to utilize caution when trying new foods when uncertain about tyramine level  Denies depression, SI, HI, anxiety or AVH  Compliant with medication  No requests at this time, assessment ongoing

## 2022-06-08 NOTE — TREATMENT TEAM
06/08/22 0857   Team Meeting   Meeting Type Daily Rounds   Team Members Present   Team Members Present Physician;Nurse;   Physician Team Member Gross   Nursing Team Member Sume[place] Management Team Member Otilia   Patient/Family Present   Patient Present No   Patient's Family Present No     Pt less depressed and anxiety  Pt social with peers, visible on the unit  Compliant with meds and meals  Continue educating about alcohol use and dietary restrictions  Continue med management - starting Nardil on 9/10  Continue to monitor  Discharge to be determined

## 2022-06-08 NOTE — PLAN OF CARE
Problem: Depression  Goal: Treatment Goal: Demonstrate behavioral control of depressive symptoms, verbalize feelings of improved mood/affect, and adopt new coping skills prior to discharge  Outcome: Progressing  Goal: Verbalize thoughts and feelings  Description: Interventions:  - Assess and re-assess patient's level of risk   - Engage patient in 1:1 interactions, daily, for a minimum of 15 minutes   - Encourage patient to express feelings, fears, frustrations, hopes   Outcome: Progressing  Goal: Refrain from harming self  Description: Interventions:  - Monitor patient closely, per order   - Supervise medication ingestion, monitor effects and side effects   Outcome: Progressing  Goal: Refrain from isolation  Description: Interventions:  - Develop a trusting relationship   - Encourage socialization   Outcome: Progressing  Goal: Refrain from self-neglect  Outcome: Progressing  Goal: Attend and participate in unit activities, including therapeutic, recreational, and educational groups  Description: Interventions:  - Provide therapeutic and educational activities daily, encourage attendance and participation, and document same in the medical record   Outcome: Progressing  Goal: Complete daily ADLs, including personal hygiene independently, as able  Description: Interventions:  - Observe, teach, and assist patient with ADLS  -  Monitor and promote a balance of rest/activity, with adequate nutrition and elimination   Outcome: Progressing     Problem: Anxiety  Goal: Anxiety is at manageable level  Description: Interventions:  - Assess and monitor patient's anxiety level  - Monitor for signs and symptoms (heart palpitations, chest pain, shortness of breath, headaches, nausea, feeling jumpy, restlessness, irritable, apprehensive)  - Collaborate with interdisciplinary team and initiate plan and interventions as ordered    - [place] patient to unit/surroundings  - Explain treatment plan  - Encourage participation in care  - Encourage verbalization of concerns/fears  - Identify coping mechanisms  - Assist in developing anxiety-reducing skills  - Administer/offer alternative therapies  - Limit or eliminate stimulants  Outcome: Progressing     Problem: Risk for Violence/Aggression Toward Others  Goal: Treatment Goal: Refrain from acts of violence/aggression during length of stay, and demonstrate improved impulse control at the time of discharge  Outcome: Progressing

## 2022-06-09 RX ADMIN — ARIPIPRAZOLE 2 MG: 2 TABLET ORAL at 08:16

## 2022-06-09 RX ADMIN — LITHIUM CARBONATE 600 MG: 300 CAPSULE, GELATIN COATED ORAL at 08:15

## 2022-06-09 RX ADMIN — MELATONIN TAB 3 MG 3 MG: 3 TAB at 21:16

## 2022-06-09 RX ADMIN — LITHIUM CARBONATE 600 MG: 300 CAPSULE, GELATIN COATED ORAL at 17:07

## 2022-06-09 RX ADMIN — LITHIUM CARBONATE 300 MG: 300 CAPSULE, GELATIN COATED ORAL at 08:16

## 2022-06-09 NOTE — NURSING NOTE
Pt visible and social on unit  Denies symptoms on approach  Accepted a phone call and was appropriate on phone  No behavioral issues  Appears flat and depressed  Compliant with unit routines and care  Will monitor

## 2022-06-09 NOTE — NURSING NOTE
Pt denies SI, HI, AH and VH  Pt is calm and cooperative  Pt is visible in the milieu, social with select peers and attending groups  Pt has no complaints or concerns  Medication and meal compliant  Will monitor

## 2022-06-09 NOTE — TREATMENT TEAM
06/09/22 0837   Team Meeting   Meeting Type Daily Rounds   Team Members Present   Team Members Present Physician;Nurse;   Physician Team Member Gross   Nursing Team Member PAVEL COLLINS [facility] Management Team Member Otilia   Patient/Family Present   Patient Present No   Patient's Family Present No     Pt less depressed and anxious  Visible and social on the unit  Compliant with meds  Encourage diet restriction  Continue med management - starting Nardil on Friday  Continue to monitor  Discharge to be determined

## 2022-06-09 NOTE — PROGRESS NOTES
[facility] Group Note     06/09/22 5496   Activity/Group Checklist   Group Community meeting  ([employer]ations)   Attendance Attended   Attendance Duration (min) 16-30   Interactions Interacted appropriately   Affect/Mood Appropriate;Calm   Goals Achieved Identified feelings; Identified triggers; Discussed coping strategies; Able to listen to others; Able to engage in interactions; Able to reflect/comment on own behavior;Able to self-disclose; Able to recieve feedback; Able to give feedback to another

## 2022-06-09 NOTE — PLAN OF CARE
Problem: Ineffective Coping  Goal: Identifies ineffective coping skills  Outcome: Progressing     Problem: Depression  Goal: Treatment Goal: Demonstrate behavioral control of depressive symptoms, verbalize feelings of improved mood/affect, and adopt new coping skills prior to discharge  Outcome: Progressing  Goal: Verbalize thoughts and feelings  Description: Interventions:  - Assess and re-assess patient's level of risk   - Engage patient in 1:1 interactions, daily, for a minimum of 15 minutes   - Encourage patient to express feelings, fears, frustrations, hopes   Outcome: Progressing  Goal: Refrain from harming self  Description: Interventions:  - Monitor patient closely, per order   - Supervise medication ingestion, monitor effects and side effects   Outcome: Progressing  Goal: Refrain from isolation  Description: Interventions:  - Develop a trusting relationship   - Encourage socialization   Outcome: Progressing  Goal: Refrain from self-neglect  Outcome: Progressing  Goal: Complete daily ADLs, including personal hygiene independently, as able  Description: Interventions:  - Observe, teach, and assist patient with ADLS  -  Monitor and promote a balance of rest/activity, with adequate nutrition and elimination   Outcome: Progressing     Problem: Anxiety  Goal: Anxiety is at manageable level  Description: Interventions:  - Assess and monitor patient's anxiety level  - Monitor for signs and symptoms (heart palpitations, chest pain, shortness of breath, headaches, nausea, feeling jumpy, restlessness, irritable, apprehensive)  - Collaborate with interdisciplinary team and initiate plan and interventions as ordered    - [place] patient to unit/surroundings  - Explain treatment plan  - Encourage participation in care  - Encourage verbalization of concerns/fears  - Identify coping mechanisms  - Assist in developing anxiety-reducing skills  - Administer/offer alternative therapies  - Limit or eliminate stimulants  Outcome: Progressing     Problem: Risk for Violence/Aggression Toward Others  Goal: Treatment Goal: Refrain from acts of violence/aggression during length of stay, and demonstrate improved impulse control at the time of discharge  Outcome: Progressing

## 2022-06-09 NOTE — RESPIRATORY THERAPY NOTE
RT called [facility] the sleep to get the settings pt had the study done  1/20/2016  Acpap min 12 max 16, according to [facility] the sleep tech  PT was suppose to come back to get updated but never did

## 2022-06-09 NOTE — PROGRESS NOTES
06/09/22 1100   Activity/Group Checklist   Group   (recovery group)   Attendance Attended   Attendance Duration (min) 46-60   Interactions Interacted appropriately   Affect/Mood Appropriate   Goals Achieved Discussed coping strategies; Discussed self-esteem issues; Able to listen to others; Able to engage in interactions; Able to reflect/comment on own behavior;Able to self-disclose; Able to recieve feedback

## 2022-06-09 NOTE — PROGRESS NOTES
Progress Note - R Arturo Palomares [age] y o  male MRN: [id number]  Unit/Bed#: [facility] 374-01 Encounter: [id number]    Assessment/Plan   Principal Problem:    Major depression, recurrent ([address] )  Active Problems:    Medical clearance for psychiatric admission    KEYLA (obstructive sleep apnea)    Morbid obesity with BMI of 50 0-59 9, adult ([facility])    Alcohol abuse    Dry skin    Patient continues the to report depression but is out of his room and making an effort to interact and engage and go to activities in groups  He understands the treatment including starting the MAO inhibitor tomorrow and will adhere to the diet restrictions and medication restrictions  Mother also help him to conform once he is out of the hospital and home  He reports no tremors from the lithium which had been an issue in the past   He is hopeful that the medications will help and that he can return to a normal life which means going back to work, socializing and being active  In 2 the patient has no taste or smell since he had COVID over year ago and so that the food is not appealing to him and somewhat the distasteful    Behavior over the last 24 hours:  unchanged  Sleep: normal  Appetite: poor  Medication side effects: No  ROS: no complaints    Mental Status Evaluation:  Appearance:  age appropriate   Behavior:  guarded   Speech:  normal pitch and normal volume   Mood:  anxious and depressed   Affect:  blunted and constricted   Thought Process:  tangential   Thought Content:  normal   Perceptual Disturbances: None   Risk Potential: Suicidal Ideations none  Homicidal Ideations none  Potential for Aggression No   Sensorium:  person, place, and situation   Memory:  recent and remote memory grossly intact   Consciousness:  alert and awake    Attention: attention span appeared shorter than expected for age   Insight:  fair   Judgment: fair   Gait/Station: normal gait/station   Motor Activity: no abnormal movements     Progress Toward Goals: Slowly improving    Recommended Treatment: Continue with group therapy, milieu therapy and occupational therapy  Risks, benefits and possible side effects of Medications:   Risks, benefits, and possible side effects of medications explained to patient and patient verbalizes understanding  Medications: all current active meds have been reviewed  Labs: I have personally reviewed all pertinent laboratory/tests results  Most Recent Labs:   Lab Results   Component Value Date    WBC 6 72 06/06/2022    RBC 4 86 06/06/2022    HGB 13 9 06/06/2022    HCT 43 4 06/06/2022     06/06/2022    RDW 13 2 06/06/2022    NEUTROABS 3 90 06/06/2022    SODIUM 139 06/06/2022    K 4 1 06/06/2022     06/06/2022    CO2 26 06/06/2022    BUN 17 06/06/2022    CREATININE 1 03 06/06/2022    GLUC 105 (H) 06/06/2022    GLUF 105 (H) 06/06/2022    CALCIUM 9 4 06/06/2022    AST 68 (H) 06/06/2022     (H) 06/06/2022    ALKPHOS 63 06/06/2022    TP 7 0 06/06/2022    ALB 4 3 06/06/2022    TBILI 1 09 06/06/2022    CHOLESTEROL 186 06/06/2022    HDL 43 06/06/2022    TRIG 101 06/06/2022    LDLCALC 123 06/06/2022    NONHDLC 143 06/06/2022    LITHIUM 0 5 (L) 06/07/2022    TSD1HMGVQHHO 3 600 06/06/2022    FREET4 1 10 06/06/2022    HGBA1C 4 9 06/06/2022    EAG 94 06/06/2022       Counseling / Coordination of Care  Total floor / unit time spent today 25 minutes  Greater than 50% of total time was spent with the patient and / or family counseling and / or coordination of care   A description of the counseling / coordination of care:

## 2022-06-09 NOTE — PROGRESS NOTES
[facility] Group Note     06/09/22 1300   Activity/Group Checklist   Group Life Skills  (Anger/Anxiety Board Game)   Attendance Attended   Attendance Duration (min) 46-60   Interactions Interacted appropriately   Affect/Mood Appropriate;Calm   Goals Achieved Identified feelings; Identified triggers; Discussed coping strategies; Able to listen to others; Able to engage in interactions; Able to reflect/comment on own behavior;Able to self-disclose; Able to recieve feedback; Able to give feedback to another

## 2022-06-10 RX ADMIN — LITHIUM CARBONATE 300 MG: 300 CAPSULE, GELATIN COATED ORAL at 08:05

## 2022-06-10 RX ADMIN — LITHIUM CARBONATE 600 MG: 300 CAPSULE, GELATIN COATED ORAL at 08:05

## 2022-06-10 RX ADMIN — MELATONIN TAB 3 MG 3 MG: 3 TAB at 21:45

## 2022-06-10 RX ADMIN — PHENELZINE SULFATE 15 MG: 15 TABLET, FILM COATED ORAL at 17:00

## 2022-06-10 RX ADMIN — LITHIUM CARBONATE 600 MG: 300 CAPSULE, GELATIN COATED ORAL at 17:00

## 2022-06-10 RX ADMIN — PHENELZINE SULFATE 15 MG: 15 TABLET, FILM COATED ORAL at 12:30

## 2022-06-10 RX ADMIN — ARIPIPRAZOLE 2 MG: 2 TABLET ORAL at 08:05

## 2022-06-10 RX ADMIN — PHENELZINE SULFATE 15 MG: 15 TABLET, FILM COATED ORAL at 08:00

## 2022-06-10 NOTE — PROGRESS NOTES
Progress Note - R Arturo Palomares [age] y o  male MRN: [id number]  Unit/Bed#: [facility] 374-01 Encounter: [id number]    Assessment/Plan   Principal Problem:    Major depression, recurrent ([address] )  Active Problems:    Medical clearance for psychiatric admission    KEYLA (obstructive sleep apnea)    Morbid obesity with BMI of 50 0-59 9, adult ([facility])    Alcohol abuse    Dry skin  Patient remains depressed and withdrawn but the is cooperative with treatment  Affect is blunted  The sleeping on and off throughout the day but does attend activities  Has started on his 1st dose of Nardil 15 mg today blood pressures will be monitored carefully following each dose  He understands the dietary restrictions and the medication restrictions and is willing to cooperate with same  Mother is concerned about him and I have talked to her at length on the telephone about his treatment and prognosis  Behavior over the last 24 hours:  unchanged  Sleep: hypersomnia  Appetite: normal  Medication side effects: No  ROS: no complaints    Mental Status Evaluation:  Appearance:  age appropriate   Behavior:  guarded   Speech:  normal pitch and normal volume   Mood:  anxious and depressed   Affect:  blunted and constricted   Thought Process:  tangential   Thought Content:  normal   Perceptual Disturbances: None   Risk Potential: Suicidal Ideations none  Homicidal Ideations none  Potential for Aggression No   Sensorium:  person, place, and situation   Memory:  recent and remote memory grossly intact   Consciousness:  alert and awake    Attention: attention span appeared shorter than expected for age   Insight:  fair   Judgment: fair   Gait/Station: normal gait/station   Motor Activity: no abnormal movements     Progress Toward Goals:  Little change    Recommended Treatment: Continue with group therapy, milieu therapy and occupational therapy        Risks, benefits and possible side effects of Medications:   Risks, benefits, and possible side effects of medications explained to patient and patient verbalizes understanding  Medications: all current active meds have been reviewed  Labs: I have personally reviewed all pertinent laboratory/tests results  Most Recent Labs:   Lab Results   Component Value Date    WBC 6 72 06/06/2022    RBC 4 86 06/06/2022    HGB 13 9 06/06/2022    HCT 43 4 06/06/2022     06/06/2022    RDW 13 2 06/06/2022    NEUTROABS 3 90 06/06/2022    SODIUM 139 06/06/2022    K 4 1 06/06/2022     06/06/2022    CO2 26 06/06/2022    BUN 17 06/06/2022    CREATININE 1 03 06/06/2022    GLUC 105 (H) 06/06/2022    GLUF 105 (H) 06/06/2022    CALCIUM 9 4 06/06/2022    AST 68 (H) 06/06/2022     (H) 06/06/2022    ALKPHOS 63 06/06/2022    TP 7 0 06/06/2022    ALB 4 3 06/06/2022    TBILI 1 09 06/06/2022    CHOLESTEROL 186 06/06/2022    HDL 43 06/06/2022    TRIG 101 06/06/2022    LDLCALC 123 06/06/2022    NONHDLC 143 06/06/2022    LITHIUM 0 5 (L) 06/07/2022    AFT2IJFRHYWG 3 600 06/06/2022    FREET4 1 10 06/06/2022    HGBA1C 4 9 06/06/2022    EAG 94 06/06/2022       Counseling / Coordination of Care  Total floor / unit time spent today 25 minutes  Greater than 50% of total time was spent with the patient and / or family counseling and / or coordination of care   A description of the counseling / coordination of care:

## 2022-06-10 NOTE — TREATMENT TEAM
06/10/22 0848   Team Meeting   Meeting Type Daily Rounds   Team Members Present   Team Members Present Physician;Nurse;   Physician Team Member Gross   Nursing Team Member [facility] Management Team Member Otilia   Patient/Family Present   Patient Present No   Patient's Family Present No     Pt less depressed  Otherwise, pt is social and visible on the unit  Attends groups  Compliant with meds and meals  Continue med management -started on Nardil 15 mg TID  Continue monitor blood pressures  Refused to use CPAP  Continue to monitor  Discharge to be determined

## 2022-06-10 NOTE — NURSING NOTE
Pt is isolative to room this evening, awake with withdrawn  Pt appears depressed and flat, refusing to answer family phone calls  Pt is scant and appears uninterested  Pt refused CPAP this evening  Pt denies SI/HI/AVH and current needs

## 2022-06-10 NOTE — PROGRESS NOTES
[facility] Group Note     06/10/22 1015   Activity/Group Checklist   Group Life Skills  (Teamwork and Communication)   Attendance Attended   Attendance Duration (min) 31-45   Interactions Interacted appropriately   Affect/Mood Appropriate;Calm   Goals Achieved Displayed empathy;Able to listen to others; Able to engage in interactions; Able to reflect/comment on own behavior;Able to recieve feedback; Able to give feedback to another

## 2022-06-10 NOTE — CASE MANAGEMENT
Pt is seen on the unit walking around nursing station  Pt appears calm and cooperative  Pt denied symptoms and states "I took my pill this morning and so far so good  I have to keep an eye on my blood pressure"  Pt was praised for engagement on the unit activities  Encouraged seeking help from staff as needed  Pt denied SI, Hi and AVH  Continue to monitor

## 2022-06-10 NOTE — PROGRESS NOTES
06/10/22 1100   Activity/Group Checklist   Group   (recovery group)   Attendance Attended   Attendance Duration (min) 46-60   Interactions Interacted appropriately   Affect/Mood Appropriate   Goals Achieved Discussed coping strategies; Discussed self-esteem issues; Able to listen to others; Able to engage in interactions; Able to reflect/comment on own behavior;Able to self-disclose; Able to recieve feedback

## 2022-06-10 NOTE — PLAN OF CARE
Problem: Ineffective Coping  Goal: Identifies ineffective coping skills  Outcome: Progressing     Problem: Depression  Goal: Treatment Goal: Demonstrate behavioral control of depressive symptoms, verbalize feelings of improved mood/affect, and adopt new coping skills prior to discharge  Outcome: Progressing  Goal: Verbalize thoughts and feelings  Description: Interventions:  - Assess and re-assess patient's level of risk   - Engage patient in 1:1 interactions, daily, for a minimum of 15 minutes   - Encourage patient to express feelings, fears, frustrations, hopes   Outcome: Progressing  Goal: Refrain from harming self  Description: Interventions:  - Monitor patient closely, per order   - Supervise medication ingestion, monitor effects and side effects   Outcome: Progressing  Goal: Refrain from isolation  Description: Interventions:  - Develop a trusting relationship   - Encourage socialization   Outcome: Progressing  Goal: Refrain from self-neglect  Outcome: Progressing  Goal: Attend and participate in unit activities, including therapeutic, recreational, and educational groups  Description: Interventions:  - Provide therapeutic and educational activities daily, encourage attendance and participation, and document same in the medical record   Outcome: Progressing  Goal: Complete daily ADLs, including personal hygiene independently, as able  Description: Interventions:  - Observe, teach, and assist patient with ADLS  -  Monitor and promote a balance of rest/activity, with adequate nutrition and elimination   Outcome: Progressing     Problem: Anxiety  Goal: Anxiety is at manageable level  Description: Interventions:  - Assess and monitor patient's anxiety level  - Monitor for signs and symptoms (heart palpitations, chest pain, shortness of breath, headaches, nausea, feeling jumpy, restlessness, irritable, apprehensive)  - Collaborate with interdisciplinary team and initiate plan and interventions as ordered    - [place] patient to unit/surroundings  - Explain treatment plan  - Encourage participation in care  - Encourage verbalization of concerns/fears  - Identify coping mechanisms  - Assist in developing anxiety-reducing skills  - Administer/offer alternative therapies  - Limit or eliminate stimulants  Outcome: Progressing     Problem: Risk for Violence/Aggression Toward Others  Goal: Treatment Goal: Refrain from acts of violence/aggression during length of stay, and demonstrate improved impulse control at the time of discharge  Outcome: Progressing     Problem: DISCHARGE PLANNING  Goal: Discharge to home or other facility with appropriate resources  Description: INTERVENTIONS:  - Identify barriers to discharge w/patient and caregiver  - Arrange for needed discharge resources and transportation as appropriate  - Identify discharge learning needs (meds, wound care, etc )  - Arrange for interpretive services to assist at discharge as needed  - Refer to Case Management Department for coordinating discharge planning if the patient needs post-hospital services based on physician/advanced practitioner order or complex needs related to functional status, cognitive ability, or social support system  Outcome: Progressing

## 2022-06-10 NOTE — CASE MANAGEMENT
Pt's mother was called at [phone] twice  No answer, left voicemail with brief status update  Requested call back for coordination  CM to follow up

## 2022-06-10 NOTE — NURSING NOTE
Patient remains complaint with medications and unit routine  He appears depressed  Has a flat affect  He reports some improvement in mood  Denies SI, HI, AVH  Denies side effects from medications  Endorses good appetite and sleep  Does not report any unmet needs

## 2022-06-11 PROCEDURE — 99232 SBSQ HOSP IP/OBS MODERATE 35: CPT | Performed by: STUDENT IN AN ORGANIZED HEALTH CARE EDUCATION/TRAINING PROGRAM

## 2022-06-11 RX ADMIN — ARIPIPRAZOLE 2 MG: 2 TABLET ORAL at 08:52

## 2022-06-11 RX ADMIN — PHENELZINE SULFATE 15 MG: 15 TABLET, FILM COATED ORAL at 08:52

## 2022-06-11 RX ADMIN — LITHIUM CARBONATE 600 MG: 300 CAPSULE, GELATIN COATED ORAL at 16:37

## 2022-06-11 RX ADMIN — MELATONIN TAB 3 MG 3 MG: 3 TAB at 21:32

## 2022-06-11 RX ADMIN — PHENELZINE SULFATE 15 MG: 15 TABLET, FILM COATED ORAL at 11:32

## 2022-06-11 RX ADMIN — PHENELZINE SULFATE 15 MG: 15 TABLET, FILM COATED ORAL at 16:38

## 2022-06-11 RX ADMIN — LITHIUM CARBONATE 300 MG: 300 CAPSULE, GELATIN COATED ORAL at 08:52

## 2022-06-11 RX ADMIN — LITHIUM CARBONATE 600 MG: 300 CAPSULE, GELATIN COATED ORAL at 08:51

## 2022-06-11 NOTE — PLAN OF CARE
Problem: Ineffective Coping  Goal: Identifies ineffective coping skills  Outcome: Progressing     Problem: Depression  Goal: Treatment Goal: Demonstrate behavioral control of depressive symptoms, verbalize feelings of improved mood/affect, and adopt new coping skills prior to discharge  Outcome: Progressing  Goal: Verbalize thoughts and feelings  Description: Interventions:  - Assess and re-assess patient's level of risk   - Engage patient in 1:1 interactions, daily, for a minimum of 15 minutes   - Encourage patient to express feelings, fears, frustrations, hopes   Outcome: Progressing  Goal: Refrain from harming self  Description: Interventions:  - Monitor patient closely, per order   - Supervise medication ingestion, monitor effects and side effects   Outcome: Progressing  Goal: Refrain from isolation  Description: Interventions:  - Develop a trusting relationship   - Encourage socialization   Outcome: Progressing  Goal: Refrain from self-neglect  Outcome: Progressing  Goal: Attend and participate in unit activities, including therapeutic, recreational, and educational groups  Description: Interventions:  - Provide therapeutic and educational activities daily, encourage attendance and participation, and document same in the medical record   Outcome: Progressing  Goal: Complete daily ADLs, including personal hygiene independently, as able  Description: Interventions:  - Observe, teach, and assist patient with ADLS  -  Monitor and promote a balance of rest/activity, with adequate nutrition and elimination   Outcome: Progressing     Problem: Anxiety  Goal: Anxiety is at manageable level  Description: Interventions:  - Assess and monitor patient's anxiety level  - Monitor for signs and symptoms (heart palpitations, chest pain, shortness of breath, headaches, nausea, feeling jumpy, restlessness, irritable, apprehensive)  - Collaborate with interdisciplinary team and initiate plan and interventions as ordered    - [place] patient to unit/surroundings  - Explain treatment plan  - Encourage participation in care  - Encourage verbalization of concerns/fears  - Identify coping mechanisms  - Assist in developing anxiety-reducing skills  - Administer/offer alternative therapies  - Limit or eliminate stimulants  Outcome: Progressing     Problem: Risk for Violence/Aggression Toward Others  Goal: Treatment Goal: Refrain from acts of violence/aggression during length of stay, and demonstrate improved impulse control at the time of discharge  Outcome: Progressing

## 2022-06-11 NOTE — NURSING NOTE
Pt pleasant upon approach and cooperative with care  Pt denies SI's, HI's, AH's, VH's  Pt educated on the importance of avoiding foods that are high in tyramine  Pt verbalized understanding of same  Pt compliant with medication and collection of vital signs  Mood appears stable and pt is spending most of the morning in common areas tossing a stress call agaist the wall and catching it  Pt maintaining personal hygiene

## 2022-06-11 NOTE — NURSING NOTE
Patient visible and cooperative with staff all evening  Offered no complaints  Patient did not request or request or require any PRN medications this shift  Watching television with peers  Offers no complaints  Patient reports he always slept well and really does not need melatonin to sleep but has been taking it since admission  Pleasant and brightens upon approach   Less irritable than usual

## 2022-06-11 NOTE — PROGRESS NOTES
Progress Note - R Arturo Palomares [age] y o  male MRN: [id number]  Unit/Bed#: [facility] 374-01 Encounter: [id number]       Patient was visited on unit for continuing care; chart reviewed and discussed with the nursing staff  On approach, the patient was calm and cooperative with brighter affect  Reported feeling better since being in the hospital  Endorsed good mood (scored his mood "7 5"/10 - 10 for the best), and denied any changes in appetite, and energy level  No problem initiating and maintaining sleep  Denied A/VH currently  Denied SI/HI, intent or plan upon direct inquiry at this time  Patient continues to be visible and interactive with staff and peers  No reports of aggression or self-injurious behavior on unit  No PRN medications used in the past 24 hours  Patient accepted all offered medications and reported feeling better  No adverse effects of medications noted or reported      Current Mental Status Evaluation:  Appearance and attitude: appeared as stated age, casually dressed, overweight, bearded, with good hygiene  Eye contact: good  Motor Function: within normal limits, intact gait, No PMA/PMR  Gait/station: normal gait/station and normal balance  Speech: normal for rate, rhythm, volume, latency, amount  Language: No overt abnormality  Mood/affect: "good" / Affect was constricted but reactive, mood congruent, brighter  Thought Processes: linear  Thought content: denies suicidal ideation or homicidal ideation; no delusions or first rank symptoms  Associations: concrete associations  Perceptual disturbances: denies Auditory/Visual/Tactile Hallucinations  Orientation: oriented to time, person, place and to the situational context  Cognitive Function: intact  Memory: recent and remote memory grossly intact  Intellect: average  Fund of knowledge: aware of current events, aware of past history and vocabulary average  Impulse control: good  Insight/judgment: fair/fair    Pain: denied  Pain scale: 0    Vital signs in last 24 hours:    Temp:  [96 °F (35 6 °C)-97 5 °F (36 4 °C)] 97 5 °F (36 4 °C)  HR:  [] 71  Resp:  [16] 16  BP: (105-168)/() 117/72    Laboratory results: I have personally reviewed all pertinent laboratory/tests results    Results from the past 24 hours: No results found for this or any previous visit (from the past 24 hour(s))  Progress Toward Goals: gradual improvement    Assessment:  Principal Problem:    Major depression, recurrent ([facility])  Active Problems:    Medical clearance for psychiatric admission    KEYLA (obstructive sleep apnea)    Morbid obesity with BMI of 50 0-59 9, adult ([facility])    Alcohol abuse    Dry skin        Plan:  - f/u SLIM recs regarding the medical problems  - Continue medication titration and treatment plan; adjust medication to optimize treatment response and as clinically indicated       Scheduled medications:  Current Facility-Administered Medications   Medication Dose Route Frequency Provider Last Rate    acetaminophen  650 mg Oral Q6H PRN [facility] [place]-      acetaminophen  650 mg Oral Q4H PRN [facility] PATanya      acetaminophen  975 mg Oral Q6H PRN Forsan, PATanya      ARIPiprazole  2 mg Oral Daily [place], [place]      haloperidol lactate  2 5 mg Intramuscular Q6H PRN Max 4/day [place], [place]      And    LORazepam  1 mg Intramuscular Q6H PRN Max 4/day [place], [place]      And    benztropine  0 5 mg Intramuscular Q6H PRN Max 4/day [place], [place]      haloperidol lactate  5 mg Intramuscular Q4H PRN Max 4/day [facility] PATanya      And    LORazepam  2 mg Intramuscular Q4H PRN Max 4/day [facility] PATanya      And    benztropine  1 mg Intramuscular Q4H PRN Max 4/day [place], [place]      benztropine  1 mg Intramuscular Q4H PRN Max 6/day [facility]      benztropine  1 mg Oral Q4H PRN Max 6/day Forsan, PATanya      haloperidol  2 mg Oral Q4H PRN Max 6/day [employer]      haloperidol  5 mg Oral Q6H PRN Max 4/day [employer]      haloperidol  5 mg Oral Q4H PRN Max 4/day [place]      hydrOXYzine HCL  25 mg Oral Q6H PRN Max 4/day [place], [place]      hydrOXYzine HCL  50 mg Oral Q4H PRN Max 4/day [place], [place]      Or    L[facility]ep[facility]  [facility] Intramuscular Q[facility]H [facility]      lithium carbonate  300 mg Oral Daily Tim Daniels MD      lithium carbonate  600 mg Oral BID With Meals Tim Daniels MD      LORazepam  1 mg Oral Q4H PRN Max 6/day [facility] GENESIS Hough      Or    LORazepam  2 mg Intramuscular Q6H PRN Max 3/day [facility]      melatonin  3 mg Oral HS [employer], [place]      phenelzine  15 mg Oral TID AC Tim Daniels MD      propranolol  10 mg Oral Q8H [facility]      white petrolatum-mineral oil   Topical 4x Daily PRN [place] GENESIS Bunch          PRN:  Cathlene Salon  acetaminophen    acetaminophen    acetaminophen    haloperidol lactate **AND** LORazepam **AND** benztropine    haloperidol lactate **AND** LORazepam **AND** benztropine    benztropine    benztropine    haloperidol    haloperidol    haloperidol    hydrOXYzine HCL    hydrOXYzine HCL **OR** LORazepam    LORazepam **OR** LORazepam    propranolol    white petrolatum-mineral oil    - Observation: routine    - VS: as per unit protocol  - Diet: Regular diet  - Psychoeducation (benefits and potential risks) discussed, importance of compliance with the psychiatric treatment reiterated, and the patient verbalized understanding of the matter  - Encourage group attendance and milieu therapy    - The pt was educated and agreed to verbalize any suicidal thoughts, frustrations or concerns to the nursing staff, immediately      - Dispo: TBD       Next of Kin  · Extended Emergency Contact Information  · Primary Emergency Contact: katherine none  · Mobile Phone: [phone]  · Relation: None      Counseling / Coordination of Care     Total floor / unit time spent today 20 minutes  Greater than 50% of total time was spent with the patient and / or family counseling and / or coordination of care  A description of the counseling / coordination of care  Patient's Rights, confidentiality and exceptions to confidentiality, use of automated medical record, [facility] Hank Bojorquez staff access to medical record, and consent to treatment reviewed      Anat Rojo MD  Attending [address]

## 2022-06-12 PROCEDURE — 99232 SBSQ HOSP IP/OBS MODERATE 35: CPT | Performed by: STUDENT IN AN ORGANIZED HEALTH CARE EDUCATION/TRAINING PROGRAM

## 2022-06-12 RX ADMIN — PHENELZINE SULFATE 15 MG: 15 TABLET, FILM COATED ORAL at 12:13

## 2022-06-12 RX ADMIN — LITHIUM CARBONATE 600 MG: 300 CAPSULE, GELATIN COATED ORAL at 08:05

## 2022-06-12 RX ADMIN — ARIPIPRAZOLE 2 MG: 2 TABLET ORAL at 08:04

## 2022-06-12 RX ADMIN — PHENELZINE SULFATE 15 MG: 15 TABLET, FILM COATED ORAL at 16:55

## 2022-06-12 RX ADMIN — MELATONIN TAB 3 MG 3 MG: 3 TAB at 21:07

## 2022-06-12 RX ADMIN — LITHIUM CARBONATE 300 MG: 300 CAPSULE, GELATIN COATED ORAL at 08:04

## 2022-06-12 RX ADMIN — LITHIUM CARBONATE 600 MG: 300 CAPSULE, GELATIN COATED ORAL at 16:54

## 2022-06-12 RX ADMIN — PHENELZINE SULFATE 15 MG: 15 TABLET, FILM COATED ORAL at 06:49

## 2022-06-12 NOTE — NURSING NOTE
Pt pleasant upon approach and cooperative with care  Pt appears to enjoy socializing with peers/staff  Pt attends groups and playing ping pong with peers  Pt with bright affect and pleasant mood  Pt using good humor  Pt denies SI's, HI's, AH's, VH's  Pt somewhat preoccupied with medications/treatments he has received over the years for depression and also compares meds to those of his mothers  Pt medication and meal compliant  Will cotcyndeeue to monitor

## 2022-06-12 NOTE — NURSING NOTE
Patient visible all evening watching television with peers  Patient's behaviors controlled and appropriate  Offered no complaints  Patient is medication complaint  Patient did not request or require any prn medications this shift

## 2022-06-12 NOTE — PLAN OF CARE
Problem: Ineffective Coping  Goal: Identifies ineffective coping skills  Outcome: Progressing     Problem: Depression  Goal: Treatment Goal: Demonstrate behavioral control of depressive symptoms, verbalize feelings of improved mood/affect, and adopt new coping skills prior to discharge  Outcome: Progressing  Goal: Verbalize thoughts and feelings  Description: Interventions:  - Assess and re-assess patient's level of risk   - Engage patient in 1:1 interactions, daily, for a minimum of 15 minutes   - Encourage patient to express feelings, fears, frustrations, hopes   Outcome: Progressing  Goal: Refrain from harming self  Description: Interventions:  - Monitor patient closely, per order   - Supervise medication ingestion, monitor effects and side effects   Outcome: Progressing  Goal: Refrain from isolation  Description: Interventions:  - Develop a trusting relationship   - Encourage socialization   Outcome: Progressing  Goal: Refrain from self-neglect  Outcome: Progressing  Goal: Attend and participate in unit activities, including therapeutic, recreational, and educational groups  Description: Interventions:  - Provide therapeutic and educational activities daily, encourage attendance and participation, and document same in the medical record   Outcome: Progressing  Goal: Complete daily ADLs, including personal hygiene independently, as able  Description: Interventions:  - Observe, teach, and assist patient with ADLS  -  Monitor and promote a balance of rest/activity, with adequate nutrition and elimination   Outcome: Progressing     Problem: Anxiety  Goal: Anxiety is at manageable level  Description: Interventions:  - Assess and monitor patient's anxiety level  - Monitor for signs and symptoms (heart palpitations, chest pain, shortness of breath, headaches, nausea, feeling jumpy, restlessness, irritable, apprehensive)  - Collaborate with interdisciplinary team and initiate plan and interventions as ordered    - [place] patient to unit/surroundings  - Explain treatment plan  - Encourage participation in care  - Encourage verbalization of concerns/fears  - Identify coping mechanisms  - Assist in developing anxiety-reducing skills  - Administer/offer alternative therapies  - Limit or eliminate stimulants  Outcome: Progressing     Problem: Risk for Violence/Aggression Toward Others  Goal: Treatment Goal: Refrain from acts of violence/aggression during length of stay, and demonstrate improved impulse control at the time of discharge  Outcome: Progressing

## 2022-06-12 NOTE — PROGRESS NOTES
Progress Note - R Arturo Palomares [age] y o  male MRN: [id number]  Unit/Bed#: [facility] 374-01 Encounter: [id number]       Patient was visited on unit for continuing care; chart reviewed and discussed with the nursing staff  On approach, the patient was calm and cooperative, preoccupied with his medications  He continues to report feeling concerned about the food restrictions he should follow as being on Nadril  Otherwise, endorsed good mood, appetite, and energy level  No problem initiating and maintaining sleep  Denied A/VH currently  Denied SI/HI, intent or plan upon direct inquiry at this time  Patient continues to be visible on the unit and interactive with staff and peers  No reports of aggression or self-injurious behavior on unit  No PRN medications used in the past 24 hours  Patient accepted all offered medications and reported feeling better  No adverse effects of medications noted or reported        Current Mental Status Evaluation:  Appearance and attitude: appeared as stated age, casually dressed, overweight, bearded, with good hygiene  Eye contact: good  Motor Function: within normal limits, intact gait, No PMA/PMR  Gait/station: normal gait/station and normal balance  Speech: normal for rate, rhythm, volume, latency, amount  Language: No overt abnormality  Mood/affect: "good" / Affect was constricted but reactive, mood congruent  Thought Processes: linear, concrete  Thought content: denied suicidal ideations or homicidal ideations, no overt delusions elicited, preoccupied with his medications  Associations: concrete associations  Perceptual disturbances: denies Auditory/Visual/Tactile Hallucinations  Orientation: oriented to time, person, place and to the situational context  Cognitive Function: intact  Memory: recent and remote memory grossly intact  Intellect: unable to assess  Fund of knowledge: aware of current events, aware of past history and vocabulary average  Impulse control: good  Insight/judgment: limited/fair    Pain: denied  Pain scale: 0    Vital signs in last 24 hours:    Temp:  [97 9 °F (36 6 °C)-98 2 °F (36 8 °C)] 97 9 °F (36 6 °C)  HR:  [] 72  Resp:  [16] 16  BP: (128-146)/(64-82) 146/76    Laboratory results: I have personally reviewed all pertinent laboratory/tests results    Results from the past 24 hours: No results found for this or any previous visit (from the past 24 hour(s))  Progress Toward Goals: slight improvement    Assessment:  Principal Problem:    Major depression, recurrent ([facility])  Active Problems:    Medical clearance for psychiatric admission    KEYLA (obstructive sleep apnea)    Morbid obesity with BMI of 50 0-59 9, adult ([facility])    Alcohol abuse    Dry skin        Plan:  - f/u SLIM recs regarding the medical problems  - Continue medication titration and treatment plan; adjust medication to optimize treatment response and as clinically indicated       Scheduled medications:  Current Facility-Administered Medications   Medication Dose Route Frequency Provider Last Rate    [facility]      acetaminophen  650 mg Oral Q4H [facility]      acetaminophen  975 mg Oral Q6H [facility]      ARIPiprazole  2 mg Oral Daily [place], [place]      haloperidol lactate  2 5 mg Intramuscular Q6H PRN Max 4/day [place], [place]      And    LORazepam  1 mg Intramuscular Q6H PRN Max 4/day [place], [place]      And    benztropine  0 5 mg Intramuscular Q6H PRN Max 4/day [place], [place]      haloperidol lactate  5 mg Intramuscular Q4H PRN Max 4/day [facility]      And    LORazepam  2 mg Intramuscular Q4H PRN Max 4/day [facility]      And    benztropine  1 mg Intramuscular Q4H PRN Max 4/day [place]      benztropine  1 mg Intramuscular Q4H PRN Max 6/day [facility]      benztropine  1 mg Oral Q4H PRN Max 6/day [place], [place]      haloperidol  2 mg Oral Q4H PRN Max 6/day [place], [place]      haloperidol  5 mg Oral Q6H PRN Max 4/day [place], [place]      haloperidol  5 mg Oral Q4H PRN Max 4/day [place], [place]      hydrOXYzine HCL  25 mg Oral Q6H PRN Max 4/day [place], [place]      hydrOXYzine HCL  50 mg Oral Q4H PRN Max 4/day [place], [place]      Or    LORazepam  1 mg Intramuscular Q4H PRN [facility]      lithium carbonate  300 mg Oral Daily Inderjit Dietz MD      lithium carbonate  600 mg Oral BID With Meals Inderjit Dietz MD      LORazepam  1 mg Oral Q4H PRN Max 6/day [facility] [place]      Or    LORazepam  2 mg Intramuscular Q6H PRN Max 3/day [facility]      melatonin  3 mg Oral HS [place]      phenelzine  15 mg Oral TID AC Inderjit Dietz MD      propranolol  10 mg Oral Q8H PRN [facility] [place]      white petrolatum-mineral oil   Topical 4x Daily PRN Gala King PA-C          PRN:  Ady Flower  acetaminophen    acetaminophen    acetaminophen    haloperidol lactate **AND** LORazepam **AND** benztropine    haloperidol lactate **AND** LORazepam **AND** benztropine    benztropine    benztropine    haloperidol    haloperidol    haloperidol    hydrOXYzine HCL    hydrOXYzine HCL **OR** LORazepam    LORazepam **OR** LORazepam    propranolol    white petrolatum-mineral oil    - Observation: routine    - VS: as per unit protocol  - Diet: Regular diet  - Psychoeducation (benefits and potential risks) discussed, importance of compliance with the psychiatric treatment reiterated, and the patient verbalized understanding of the matter  - Encourage group attendance and milieu therapy    - The pt was educated and agreed to verbalize any suicidal thoughts, frustrations or concerns to the nursing staff, immediately      - Dispo: TBD       Next of Kin  · Extended Emergency Contact Information  · Primary Emergency Contact: contanct, none  · Mobile Phone: [phone]  · Relation: None      Counseling / Coordination of Care     Total floor / unit time spent today 20 minutes  Greater than 50% of total time was spent with the patient and / or family counseling and / or coordination of care  A description of the counseling / coordination of care  Patient's Rights, confidentiality and exceptions to confidentiality, use of automated medical record, [address] staff access to medical record, and consent to treatment reviewed      Mitchell Pepe MD  Attending [address]

## 2022-06-13 RX ORDER — PHENELZINE SULFATE 15 MG/1
30 TABLET ORAL 2 TIMES DAILY
Status: DISCONTINUED | OUTPATIENT
Start: 2022-06-13 | End: 2022-06-15 | Stop reason: HOSPADM

## 2022-06-13 RX ADMIN — LITHIUM CARBONATE 600 MG: 300 CAPSULE, GELATIN COATED ORAL at 08:01

## 2022-06-13 RX ADMIN — ARIPIPRAZOLE 2 MG: 2 TABLET ORAL at 08:01

## 2022-06-13 RX ADMIN — PHENELZINE SULFATE 30 MG: 15 TABLET, FILM COATED ORAL at 17:04

## 2022-06-13 RX ADMIN — PHENELZINE SULFATE 15 MG: 15 TABLET, FILM COATED ORAL at 06:40

## 2022-06-13 RX ADMIN — LITHIUM CARBONATE 300 MG: 300 CAPSULE, GELATIN COATED ORAL at 08:01

## 2022-06-13 RX ADMIN — LITHIUM CARBONATE 600 MG: 300 CAPSULE, GELATIN COATED ORAL at 17:04

## 2022-06-13 NOTE — NURSING NOTE
Patient refused his CPAP machine when offered  Patient cooperative and visible all evening  Behaviors controlled and appropriate also  No prn medication required or requested  Patient did report he had to desensitize himself to "Death" because everything and everyone around him started to die at the age of [de-identified]  He explained he learned how to accept death without letting it get the best of his emotions  Helpful to a select peer who was going through a tough time this evening

## 2022-06-13 NOTE — PLAN OF CARE
Problem: Ineffective Coping  Goal: Identifies ineffective coping skills  Outcome: Progressing     Problem: Depression  Goal: Treatment Goal: Demonstrate behavioral control of depressive symptoms, verbalize feelings of improved mood/affect, and adopt new coping skills prior to discharge  Outcome: Progressing  Goal: Verbalize thoughts and feelings  Description: Interventions:  - Assess and re-assess patient's level of risk   - Engage patient in 1:1 interactions, daily, for a minimum of 15 minutes   - Encourage patient to express feelings, fears, frustrations, hopes   Outcome: Progressing  Goal: Refrain from harming self  Description: Interventions:  - Monitor patient closely, per order   - Supervise medication ingestion, monitor effects and side effects   Outcome: Progressing  Goal: Refrain from isolation  Description: Interventions:  - Develop a trusting relationship   - Encourage socialization   Outcome: Progressing  Goal: Refrain from self-neglect  Outcome: Progressing  Goal: Attend and participate in unit activities, including therapeutic, recreational, and educational groups  Description: Interventions:  - Provide therapeutic and educational activities daily, encourage attendance and participation, and document same in the medical record   Outcome: Progressing  Goal: Complete daily ADLs, including personal hygiene independently, as able  Description: Interventions:  - Observe, teach, and assist patient with ADLS  -  Monitor and promote a balance of rest/activity, with adequate nutrition and elimination   Outcome: Progressing     Problem: Anxiety  Goal: Anxiety is at manageable level  Description: Interventions:  - Assess and monitor patient's anxiety level  - Monitor for signs and symptoms (heart palpitations, chest pain, shortness of breath, headaches, nausea, feeling jumpy, restlessness, irritable, apprehensive)  - Collaborate with interdisciplinary team and initiate plan and interventions as ordered    - [place] patient to unit/surroundings  - Explain treatment plan  - Encourage participation in care  - Encourage verbalization of concerns/fears  - Identify coping mechanisms  - Assist in developing anxiety-reducing skills  - Administer/offer alternative therapies  - Limit or eliminate stimulants  Outcome: Progressing     Problem: Risk for Violence/Aggression Toward Others  Goal: Treatment Goal: Refrain from acts of violence/aggression during length of stay, and demonstrate improved impulse control at the time of discharge  Outcome: Progressing

## 2022-06-13 NOTE — PLAN OF CARE
Pt often seem social with peers, attends groups and expresses his needs  Remains curious about dietary restrictions  Pt was praised for his compliance and active engagement in unit activities  Pt denies SI, HI and AVH  Continue to monitor       Problem: DISCHARGE PLANNING  Goal: Discharge to home or other facility with appropriate resources  Description: INTERVENTIONS:  - Identify barriers to discharge w/patient and caregiver  - Arrange for needed discharge resources and transportation as appropriate  - Identify discharge learning needs (meds, wound care, etc )  - Arrange for interpretive services to assist at discharge as needed  - Refer to Case Management Department for coordinating discharge planning if the patient needs post-hospital services based on physician/advanced practitioner order or complex needs related to functional status, cognitive ability, or social support system  Outcome: Progressing

## 2022-06-13 NOTE — PROGRESS NOTES
[facility] Group Note     06/13/22 9740   Activity/Group Checklist   Group Other (Comment)  (GUERO)   Attendance Attended   Attendance Duration (min) 16-30   Interactions Interacted appropriately   Affect/Mood Appropriate;Calm   Goals Achieved Able to listen to others; Able to engage in interactions; Able to recieve feedback; Able to give feedback to another

## 2022-06-13 NOTE — PROGRESS NOTES
06/13/22 1100   Activity/Group Checklist   Group   (recovery group (grief))   Attendance Attended   Attendance Duration (min) 46-60   Interactions Interacted appropriately   Affect/Mood Appropriate   Goals Achieved Discussed coping strategies; Discussed self-esteem issues; Displayed empathy;Able to listen to others; Able to engage in interactions; Able to reflect/comment on own behavior;Able to self-disclose; Able to recieve feedback; Able to give feedback to another

## 2022-06-13 NOTE — NURSING NOTE
Pt remains pleasant on approach and cooperative with care  Affect if bright and congruent to mood  Pt denies SI's, HI's, AH's, VH's  Pt denies feeling improvement yet from the medication changes, but spoke about talking to mother on phone and her telling him that based on literature, to expect a big jump on the Nardil to 60mg  Pt calls himself a "lab monkey" but reports trust in current psychiatrist   Pt visible in common areas of milieu throughout day and is social and supportive of peers  Will continue to monitor

## 2022-06-13 NOTE — PROGRESS NOTES
[facility] Group Note     06/13/22 1000   Activity/Group Checklist   Group Personal control  (Mindfulness and Meditation)   Attendance Attended   Attendance Duration (min) 46-60   Interactions Interacted appropriately   Affect/Mood Appropriate;Bright   Goals Achieved Identified feelings; Identified triggers; Discussed coping strategies; Able to listen to others; Able to engage in interactions; Able to reflect/comment on own behavior;Able to self-disclose; Able to recieve feedback; Able to give feedback to another

## 2022-06-13 NOTE — PROGRESS NOTES
Progress Note - R Arturo Palomares [age] y o  male MRN: [id number]  Unit/Bed#: [facility] 374-01 Encounter: [id number]    Assessment/Plan   Principal Problem:    Major depression, recurrent ([address] )  Active Problems:    Medical clearance for psychiatric admission    KEYLA (obstructive sleep apnea)    Morbid obesity with BMI of 50 0-59 9, adult ([facility])    Alcohol abuse    Dry skin  Patient has had a few doses of Nardil thus far and has tolerated well  No change in blood pressure other symptoms reported  He regret remains out of his room and interacts with staff and patients and is cooperative treatment  Many questions about the diet on Nardil and the fact that the he was given santana on his tray by mistake  He speak to his mother several times a day he was quite supportive  The tolerating lithium at higher doses without any tremors which were an issue in the past     Behavior over the last 24 hours:  improved  Sleep: normal  Appetite: normal  Medication side effects: No  ROS: no complaints    Mental Status Evaluation:  Appearance:  age appropriate   Behavior:  guarded   Speech:  normal pitch and normal volume   Mood:  anxious and depressed   Affect:  blunted and constricted   Thought Process:  tangential   Thought Content:  normal   Perceptual Disturbances: None   Risk Potential: Suicidal Ideations none  Homicidal Ideations none  Potential for Aggression No   Sensorium:  person, place, and situation   Memory:  recent and remote memory grossly intact   Consciousness:  alert and awake    Attention: attention span appeared shorter than expected for age   Insight:  fair   Judgment: fair   Gait/Station: normal gait/station   Motor Activity: no abnormal movements     Progress Toward Goals:  Slowly improving    Recommended Treatment: Continue with group therapy, milieu therapy and occupational therapy        Risks, benefits and possible side effects of Medications:   Risks, benefits, and possible side effects of medications explained to patient and patient verbalizes understanding  Medications: planned medication changes: Increase Nardil to 30 mg b i d     Labs: Lithium level ordered for tomorrow  Most Recent Labs:   Lab Results   Component Value Date    WBC 6 72 06/06/2022    RBC 4 86 06/06/2022    HGB 13 9 06/06/2022    HCT 43 4 06/06/2022     06/06/2022    RDW 13 2 06/06/2022    NEUTROABS 3 90 06/06/2022    SODIUM 139 06/06/2022    K 4 1 06/06/2022     06/06/2022    CO2 26 06/06/2022    BUN 17 06/06/2022    CREATININE 1 03 06/06/2022    GLUC 105 (H) 06/06/2022    GLUF 105 (H) 06/06/2022    CALCIUM 9 4 06/06/2022    AST 68 (H) 06/06/2022     (H) 06/06/2022    ALKPHOS 63 06/06/2022    TP 7 0 06/06/2022    ALB 4 3 06/06/2022    TBILI 1 09 06/06/2022    CHOLESTEROL 186 06/06/2022    HDL 43 06/06/2022    TRIG 101 06/06/2022    LDLCALC 123 06/06/2022    NONHDLC 143 06/06/2022    LITHIUM 0 5 (L) 06/07/2022    [id number] 3 600 06/06/2022    FREET4 1 10 06/06/2022    HGBA1C 4 9 06/06/2022    EAG 94 06/06/2022       Counseling / Coordination of Care  Total floor / unit time spent today 25 minutes  Greater than 50% of total time was spent with the patient and / or family counseling and / or coordination of care   A description of the counseling / coordination of care:

## 2022-06-13 NOTE — TREATMENT TEAM
06/13/22 0837   Team Meeting   Meeting Type Daily Rounds   Team Members Present   Team Members Present Physician;Nurse;   Physician Team Member Gross   Nursing Team Member [facility]   Care Management Team Member Otilia   Patient/Family Present   Patient Present No   Patient's Family Present No     Pt visible, social and denies symptoms  Refuses to use CPAP  Compliant with meds and meals  Continue monitor blood pressure  No adverse symptoms  reported Compliant with meds -Increase Nardil to 60 mg  Continue to monitor  Discharge tentatively end of this week

## 2022-06-14 LAB — LITHIUM SERPL-SCNC: 0.6 MMOL/L (ref 0.6–1.2)

## 2022-06-14 PROCEDURE — 80178 ASSAY OF LITHIUM: CPT | Performed by: PSYCHIATRY & NEUROLOGY

## 2022-06-14 RX ADMIN — LITHIUM CARBONATE 600 MG: 300 CAPSULE, GELATIN COATED ORAL at 08:04

## 2022-06-14 RX ADMIN — LITHIUM CARBONATE 600 MG: 300 CAPSULE, GELATIN COATED ORAL at 17:28

## 2022-06-14 RX ADMIN — LITHIUM CARBONATE 300 MG: 300 CAPSULE, GELATIN COATED ORAL at 08:04

## 2022-06-14 RX ADMIN — ARIPIPRAZOLE 2 MG: 2 TABLET ORAL at 08:04

## 2022-06-14 RX ADMIN — PHENELZINE SULFATE 30 MG: 15 TABLET, FILM COATED ORAL at 08:04

## 2022-06-14 RX ADMIN — MELATONIN TAB 3 MG 3 MG: 3 TAB at 21:00

## 2022-06-14 RX ADMIN — PHENELZINE SULFATE 30 MG: 15 TABLET, FILM COATED ORAL at 17:28

## 2022-06-14 NOTE — PROGRESS NOTES
Progress Note - R Arturo Palomares 115 19 y o  male MRN: [id number]  Unit/Bed#: [facility] 374-01 Encounter: [id number]    Assessment/Plan   Principal Problem:    Major depression, recurrent ([address] )  Active Problems:    Medical clearance for psychiatric admission    KEYLA (obstructive sleep apnea)    Morbid obesity with BMI of 50 0-59 9, adult ([facility])    Alcohol abuse    Dry skin  Patient continues because of cooperative the somewhat irritable at times but has been out of his room and interacting in appropriate with staff and patients  He is wondering how he will be feeling once he is discharged because he is feeling better in a structured hospital environment and hopes to continues following discharge  He is compliant with the medication understands MAO inhibitor restrictions and probed missions and said he will comply  He has tolerated 60 mg a Nardil thus far blood pressures remained within normal limits  Behavior over the last 24 hours:  improved  Sleep: normal  Appetite: normal  Medication side effects: No  ROS: no complaints    Mental Status Evaluation:  Appearance:  age appropriate   Behavior:  guarded   Speech:  normal pitch and normal volume   Mood:  anxious and depressed   Affect:  blunted and constricted   Thought Process:  tangential   Thought Content:  normal   Perceptual Disturbances: None   Risk Potential: Suicidal Ideations none  Homicidal Ideations none  Potential for Aggression No   Sensorium:  person, place, and situation   Memory:  recent and remote memory grossly intact   Consciousness:  alert and awake    Attention: attention span appeared shorter than expected for age   Insight:  fair   Judgment: fair   Gait/Station: normal gait/station   Motor Activity: no abnormal movements     Progress Toward Goals:  Improving    Recommended Treatment: Continue with group therapy, milieu therapy and occupational therapy        Risks, benefits and possible side effects of Medications:   Risks, benefits, and possible side effects of medications explained to patient and patient verbalizes understanding  Medications: all current active meds have been reviewed  Labs: I have personally reviewed all pertinent laboratory/tests results  Most Recent Labs:   Lab Results   Component Value Date    WBC 6 72 06/06/2022    RBC 4 86 06/06/2022    HGB 13 9 06/06/2022    HCT 43 4 06/06/2022     06/06/2022    RDW 13 2 06/06/2022    NEUTROABS 3 90 06/06/2022    SODIUM 139 06/06/2022    K 4 1 06/06/2022     06/06/2022    CO2 26 06/06/2022    BUN 17 06/06/2022    CREATININE 1 03 06/06/2022    GLUC 105 (H) 06/06/2022    GLUF 105 (H) 06/06/2022    CALCIUM 9 4 06/06/2022    AST 68 (H) 06/06/2022     (H) 06/06/2022    ALKPHOS 63 06/06/2022    TP 7 0 06/06/2022    ALB 4 3 06/06/2022    TBILI 1 09 06/06/2022    CHOLESTEROL 186 06/06/2022    HDL 43 06/06/2022    TRIG 101 06/06/2022    LDLCALC 123 06/06/2022    NONHDLC 143 06/06/2022    LITHIUM 0 6 06/14/2022    [id number] 3 600 06/06/2022    FREET4 1 10 06/06/2022    HGBA1C 4 9 06/06/2022    EAG 94 06/06/2022       Counseling / Coordination of Care  Total floor / unit time spent today 25 minutes  Greater than 50% of total time was spent with the patient and / or family counseling and / or coordination of care   A description of the counseling / coordination of care:

## 2022-06-14 NOTE — PLAN OF CARE
Met with patient for discharge planning  Pt states has been stable on this current meds and looking forward being discharged tomorrow  Pt also noted that he will adhere to the restrictions to make sure he is getting most out of the meds  Pt plans to follow up with [facility]par for his follow up appointments  Pt denied having SI, HI and AVH  Pt appears calm, cooperative and pleasant  Continue to monitor         Problem: DISCHARGE PLANNING  Goal: Discharge to home or other facility with appropriate resources  Description: INTERVENTIONS:  - Identify barriers to discharge w/patient and caregiver  - Arrange for needed discharge resources and transportation as appropriate  - Identify discharge learning needs (meds, wound care, etc )  - Arrange for interpretive services to assist at discharge as needed  - Refer to Case Management Department for coordinating discharge planning if the patient needs post-hospital services based on physician/advanced practitioner order or complex needs related to functional status, cognitive ability, or social support system  Outcome: Progressing

## 2022-06-14 NOTE — NURSING NOTE
Pt denies psych symptoms  Pt has no complaints or concerns  Pt is visible in the milieu and social with peers  Medication and meal complaint  Will monitor

## 2022-06-14 NOTE — CASE MANAGEMENT
Pt's psychiatrist office -[facility] was called at [phone]; provided discharge notification  Staff requested AVS be faxed at [phone]  Staff noted that their office will call the patient directly to schedule follow up appointment   This is noted in patient's AVS

## 2022-06-14 NOTE — NURSING NOTE
Patient is in the community and social with peers  Patient follows the milieu and denies all symptoms  Patient is compliant with scheduled medications  Staff to maintain continuous rounding for safety and support

## 2022-06-14 NOTE — PROGRESS NOTES
06/14/22 1100   Activity/Group Checklist   Group   (recovery group)   Attendance Attended   Attendance Duration (min) 46-60   Interactions Interacted appropriately   Affect/Mood Appropriate   Goals Achieved Discussed coping strategies; Discussed self-esteem issues; Able to listen to others; Able to engage in interactions; Able to reflect/comment on own behavior;Able to self-disclose; Able to recieve feedback; Able to give feedback to another

## 2022-06-14 NOTE — PLAN OF CARE
Problem: Ineffective Coping  Goal: Identifies ineffective coping skills  Outcome: Progressing     Problem: Depression  Goal: Treatment Goal: Demonstrate behavioral control of depressive symptoms, verbalize feelings of improved mood/affect, and adopt new coping skills prior to discharge  Outcome: Progressing  Goal: Verbalize thoughts and feelings  Description: Interventions:  - Assess and re-assess patient's level of risk   - Engage patient in 1:1 interactions, daily, for a minimum of 15 minutes   - Encourage patient to express feelings, fears, frustrations, hopes   Outcome: Progressing  Goal: Refrain from harming self  Description: Interventions:  - Monitor patient closely, per order   - Supervise medication ingestion, monitor effects and side effects   Outcome: Progressing  Goal: Refrain from isolation  Description: Interventions:  - Develop a trusting relationship   - Encourage socialization   Outcome: Progressing  Goal: Refrain from self-neglect  Outcome: Progressing  Goal: Complete daily ADLs, including personal hygiene independently, as able  Description: Interventions:  - Observe, teach, and assist patient with ADLS  -  Monitor and promote a balance of rest/activity, with adequate nutrition and elimination   Outcome: Progressing     Problem: Anxiety  Goal: Anxiety is at manageable level  Description: Interventions:  - Assess and monitor patient's anxiety level  - Monitor for signs and symptoms (heart palpitations, chest pain, shortness of breath, headaches, nausea, feeling jumpy, restlessness, irritable, apprehensive)  - Collaborate with interdisciplinary team and initiate plan and interventions as ordered    - [place] patient to unit/surroundings  - Explain treatment plan  - Encourage participation in care  - Encourage verbalization of concerns/fears  - Identify coping mechanisms  - Assist in developing anxiety-reducing skills  - Administer/offer alternative therapies  - Limit or eliminate stimulants  Outcome: Progressing     Problem: Risk for Violence/Aggression Toward Others  Goal: Treatment Goal: Refrain from acts of violence/aggression during length of stay, and demonstrate improved impulse control at the time of discharge  Outcome: Progressing

## 2022-06-14 NOTE — NURSING NOTE
Pt denies SI, HI, AH and VH  Pt is visible in the milieu, attending groups and social with peers  Pt has no complaints or concerns  Pt is calm and cooperative  Medication and meal compliant  Will monitor

## 2022-06-14 NOTE — TREATMENT TEAM
06/14/22 0837   Team Meeting   Meeting Type Daily Rounds   Team Members Present   Team Members Present Physician;Nurse;   Physician Team Member Gross   Nursing Team Member PAVEL URBANKARINA [facility] Management Team Member Otilia   Patient/Family Present   Patient Present No   Patient's Family Present No     Pt visible and social on the unit  Pt denies any adverse reactions  Continue meds  Consider Discharge for Wednesday  Continue to monitor

## 2022-06-15 ENCOUNTER — TELEPHONE (OUTPATIENT)
Dept: OTHER | Facility: OTHER | Age: 22
End: 2022-06-15

## 2022-06-15 VITALS
WEIGHT: 315 LBS | SYSTOLIC BLOOD PRESSURE: 143 MMHG | OXYGEN SATURATION: 100 % | BODY MASS INDEX: 39.17 KG/M2 | DIASTOLIC BLOOD PRESSURE: 60 MMHG | HEIGHT: 75 IN | HEART RATE: 82 BPM | RESPIRATION RATE: 16 BRPM | TEMPERATURE: 97 F

## 2022-06-15 RX ORDER — LITHIUM CARBONATE 600 MG/1
600 CAPSULE ORAL 2 TIMES DAILY WITH MEALS
Qty: 60 CAPSULE | Refills: 2 | Status: SHIPPED | OUTPATIENT
Start: 2022-06-15

## 2022-06-15 RX ORDER — LITHIUM CARBONATE 300 MG/1
300 CAPSULE ORAL DAILY
Qty: 30 CAPSULE | Refills: 2 | Status: SHIPPED | OUTPATIENT
Start: 2022-06-15

## 2022-06-15 RX ORDER — ARIPIPRAZOLE 2 MG/1
2 TABLET ORAL DAILY
Qty: 30 TABLET | Refills: 2 | Status: SHIPPED | OUTPATIENT
Start: 2022-06-15

## 2022-06-15 RX ORDER — LANOLIN ALCOHOL/MO/W.PET/CERES
3 CREAM (GRAM) TOPICAL
Qty: 30 TABLET | Refills: 2 | Status: SHIPPED | OUTPATIENT
Start: 2022-06-15

## 2022-06-15 RX ORDER — PHENELZINE SULFATE 15 MG/1
30 TABLET ORAL 2 TIMES DAILY
Qty: 60 TABLET | Refills: 2 | Status: SHIPPED | OUTPATIENT
Start: 2022-06-15

## 2022-06-15 RX ADMIN — LITHIUM CARBONATE 300 MG: 300 CAPSULE, GELATIN COATED ORAL at 08:01

## 2022-06-15 RX ADMIN — LITHIUM CARBONATE 600 MG: 300 CAPSULE, GELATIN COATED ORAL at 08:02

## 2022-06-15 RX ADMIN — PHENELZINE SULFATE 30 MG: 15 TABLET, FILM COATED ORAL at 08:01

## 2022-06-15 RX ADMIN — ARIPIPRAZOLE 2 MG: 2 TABLET ORAL at 08:01

## 2022-06-15 NOTE — PROGRESS NOTES
06/15/22 1100   Activity/Group Checklist   Group   (recovery group)   Attendance Attended   Attendance Duration (min) 46-60   Interactions Interacted appropriately   Affect/Mood Appropriate   Goals Achieved Discussed safety plan;Discussed coping strategies; Discussed self-esteem issues; Displayed empathy;Able to listen to others; Able to engage in interactions; Able to reflect/comment on own behavior;Able to self-disclose; Able to recieve feedback; Able to give feedback to another

## 2022-06-15 NOTE — PROGRESS NOTES
DEL TORO Group Note     06/15/22 0945 06/15/22 1000   Activity/Group Checklist   Group Admission/Discharge Life Skills  (Values and Perspectives)   Attendance Attended Attended   Attendance Duration (min) 0-15 46-60   Interactions Interacted appropriately Interacted appropriately   Affect/Mood Appropriate;Calm Appropriate;Bright   Goals Achieved Identified feelings; Identified triggers; Discussed coping strategies; Discussed discharge plans; Able to listen to others; Able to engage in interactions; Able to reflect/comment on own behavior;Able to self-disclose; Able to recieve feedback; Able to give feedback to another Identified feelings; Identified triggers; Identified relapse prevention strategies; Discussed coping strategies; Discussed discharge plans; Discussed self-esteem issues; Displayed empathy;Able to listen to others; Able to engage in interactions; Able to reflect/comment on own behavior;Able to self-disclose; Able to recieve feedback; Able to give feedback to another

## 2022-06-15 NOTE — TREATMENT TEAM
06/15/22 0846   Team Meeting   Meeting Type Daily Rounds   Team Members Present   Team Members Present Physician;Nurse;   Physician Team Member Gross   Nursing Team Member Sumeet Management Team Member Otilia   Patient/Family Present   Patient Present No   Patient's Family Present No     Pt social and visible on the unit  Attends groups  Denies symptoms  Compliant with treatment  Looking forward to the discharge   Discharge meds be sent to [facility] on [address] [place] [address]  D/ home with mother today at 1 pm

## 2022-06-15 NOTE — CASE MANAGEMENT
Voicemail received from patient's mother Nella apologizing that she missed there writer's phone calls  Mother also noted that she will come by today around 1 pm to  patient  CM to return call

## 2022-06-15 NOTE — CASE MANAGEMENT
Pt's mother Fredarshan Labor was called at [phone]; left voicemail with brief status update and requested a call back for discharge planning  CM to follow up

## 2022-06-15 NOTE — TELEPHONE ENCOUNTER
Patient was recently ordered Phenelzine 30 mg and [facility] pharmacy is calling because they still have listed that patient is taking bupropion and there is an interaction if taking both  Please call the pharmacy back

## 2022-06-15 NOTE — CASE MANAGEMENT
Nella, pt's mother was called at [phone]; left voicemail advising pt's discharge scheduled for tomorrow  Requested a call back for discharge planning  CM to follow up

## 2022-06-15 NOTE — NURSING NOTE
Pt denies SI/HI/AVH at this time, compliant with scheduled medications and meals  Visible on the unit and appropriate with staff and peers  Discharge paperwork and medications reviewed with patient  Patient verbalized understanding  All belongings received on admission were returned to patient at time of discharge  Patient voiced no questions or concerns  Pt escorted off unit by [facility] to lobby for discharge at 1316

## 2022-06-15 NOTE — PLAN OF CARE
Problem: Depression  Goal: Treatment Goal: Demonstrate behavioral control of depressive symptoms, verbalize feelings of improved mood/affect, and adopt new coping skills prior to discharge  Outcome: Progressing  Goal: Verbalize thoughts and feelings  Description: Interventions:  - Assess and re-assess patient's level of risk   - Engage patient in 1:1 interactions, daily, for a minimum of 15 minutes   - Encourage patient to express feelings, fears, frustrations, hopes   Outcome: Progressing  Goal: Refrain from harming self  Description: Interventions:  - Monitor patient closely, per order   - Supervise medication ingestion, monitor effects and side effects   Outcome: Progressing  Goal: Refrain from isolation  Description: Interventions:  - Develop a trusting relationship   - Encourage socialization   Outcome: Progressing  Goal: Refrain from self-neglect  Outcome: Progressing  Goal: Attend and participate in unit activities, including therapeutic, recreational, and educational groups  Description: Interventions:  - Provide therapeutic and educational activities daily, encourage attendance and participation, and document same in the medical record   Outcome: Progressing  Goal: Complete daily ADLs, including personal hygiene independently, as able  Description: Interventions:  - Observe, teach, and assist patient with ADLS  -  Monitor and promote a balance of rest/activity, with adequate nutrition and elimination   Outcome: Progressing     Problem: Anxiety  Goal: Anxiety is at manageable level  Description: Interventions:  - Assess and monitor patient's anxiety level  - Monitor for signs and symptoms (heart palpitations, chest pain, shortness of breath, headaches, nausea, feeling jumpy, restlessness, irritable, apprehensive)  - Collaborate with interdisciplinary team and initiate plan and interventions as ordered    - [place] patient to unit/surroundings  - Explain treatment plan  - Encourage participation in care  - Encourage verbalization of concerns/fears  - Identify coping mechanisms  - Assist in developing anxiety-reducing skills  - Administer/offer alternative therapies  - Limit or eliminate stimulants  Outcome: Progressing     Problem: Risk for Violence/Aggression Toward Others  Goal: Treatment Goal: Refrain from acts of violence/aggression during length of stay, and demonstrate improved impulse control at the time of discharge  Outcome: Progressing

## 2022-06-15 NOTE — NURSING NOTE
Patient visible throughout the unit and in the dayroom  Patient calm, cooperative, pleasant to staff and peers  Patient stated being happy to be discharged soon  Denies SI/HI/VH/AH  Complaint with medication and evening routine  Denies any unmet needs  Will continue to monitor

## 2022-06-15 NOTE — BH TRANSITION RECORD
Contact Information: If you have any questions, concerns, pended studies, tests and/or procedures, or emergencies regarding your inpatient behavioral health visit  Please contact [facility] behavioral health unit 3P [phone] and ask to speak to a , nurse or physician  A contact is available 24 hours/ 7 days a week at this number  Summary of Procedures Performed During your Stay:  Below is a list of major procedures performed during your hospital stay and a summary of results:  - No major procedures performed  Pending Studies (From admission, onward)    None        If studies are pending at discharge, follow up with your PCP and/or referring provider

## 2022-06-15 NOTE — CASE MANAGEMENT
Nella, pt's mother was called  [phone] and phone was not answered  CM left voicemail advising pt's discharge plan and follow up with [facility]  Pt's meds being sent to the [facility]  Requested a call back if she has questions about discharge plan
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
## 2024-09-01 ENCOUNTER — HOSPITAL ENCOUNTER (EMERGENCY)
Facility: HOSPITAL | Age: 24
Discharge: HOME | End: 2024-09-01
Attending: EMERGENCY MEDICINE | Admitting: EMERGENCY MEDICINE
Payer: COMMERCIAL

## 2024-09-01 VITALS
HEART RATE: 100 BPM | DIASTOLIC BLOOD PRESSURE: 57 MMHG | TEMPERATURE: 98 F | RESPIRATION RATE: 20 BRPM | SYSTOLIC BLOOD PRESSURE: 127 MMHG | OXYGEN SATURATION: 98 %

## 2024-09-01 DIAGNOSIS — F10.920 ALCOHOLIC INTOXICATION WITHOUT COMPLICATION (HCC): Primary | ICD-10-CM

## 2024-09-01 PROCEDURE — 99284 EMERGENCY DEPT VISIT MOD MDM: CPT

## 2024-09-01 PROCEDURE — 99283 EMERGENCY DEPT VISIT LOW MDM: CPT | Performed by: EMERGENCY MEDICINE

## 2024-09-01 RX ORDER — QUETIAPINE FUMARATE 25 MG/1
25 TABLET, FILM COATED ORAL
COMMUNITY
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
## 2024-09-01 NOTE — ED NOTES
Patient's mom called, requesting update whenever possible   Nella: [phone]     Rebeka Moscoso RN  09/01/24 0016